# Patient Record
Sex: FEMALE | Race: WHITE | NOT HISPANIC OR LATINO | Employment: FULL TIME | URBAN - METROPOLITAN AREA
[De-identification: names, ages, dates, MRNs, and addresses within clinical notes are randomized per-mention and may not be internally consistent; named-entity substitution may affect disease eponyms.]

---

## 2019-11-26 LAB — CRC RECOMMENDATION EXT: NORMAL

## 2021-02-12 LAB — BCS RECOMMENDATION EXT: NORMAL

## 2022-04-10 ENCOUNTER — HISTORICAL (OUTPATIENT)
Dept: ADMINISTRATIVE | Facility: HOSPITAL | Age: 47
End: 2022-04-10

## 2022-04-28 VITALS
DIASTOLIC BLOOD PRESSURE: 87 MMHG | SYSTOLIC BLOOD PRESSURE: 134 MMHG | BODY MASS INDEX: 41.65 KG/M2 | WEIGHT: 250 LBS | HEIGHT: 65 IN | OXYGEN SATURATION: 98 %

## 2023-06-22 LAB
PAP RECOMMENDATION EXT: NORMAL
PAP SMEAR: NORMAL

## 2023-09-11 ENCOUNTER — TELEPHONE (OUTPATIENT)
Dept: INTERNAL MEDICINE | Facility: CLINIC | Age: 48
End: 2023-09-11
Payer: COMMERCIAL

## 2023-09-11 NOTE — TELEPHONE ENCOUNTER
----- Message from Kelly Stern LPN sent at 9/11/2023 11:25 AM CDT -----  Regarding: BHARATHI Burrell 9/18/23 @10:40a  Are there any outstanding tasks in the chart? New patient     Is there any documentation of tasks? no    Has patient seen another physician, been to the ER, C, or admitted to hospital since last visit?    Has the patient done blood work or imaging since last visit?

## 2023-09-18 ENCOUNTER — LAB VISIT (OUTPATIENT)
Dept: LAB | Facility: HOSPITAL | Age: 48
End: 2023-09-18
Attending: INTERNAL MEDICINE
Payer: COMMERCIAL

## 2023-09-18 ENCOUNTER — OFFICE VISIT (OUTPATIENT)
Dept: INTERNAL MEDICINE | Facility: CLINIC | Age: 48
End: 2023-09-18
Payer: COMMERCIAL

## 2023-09-18 VITALS
RESPIRATION RATE: 18 BRPM | BODY MASS INDEX: 46.65 KG/M2 | HEIGHT: 65 IN | OXYGEN SATURATION: 99 % | SYSTOLIC BLOOD PRESSURE: 134 MMHG | DIASTOLIC BLOOD PRESSURE: 72 MMHG | WEIGHT: 280 LBS | HEART RATE: 79 BPM

## 2023-09-18 DIAGNOSIS — R73.09 ELEVATED GLUCOSE: ICD-10-CM

## 2023-09-18 DIAGNOSIS — R73.09 ELEVATED GLUCOSE: Primary | ICD-10-CM

## 2023-09-18 DIAGNOSIS — N95.1 PERIMENOPAUSAL: ICD-10-CM

## 2023-09-18 PROBLEM — E66.01 MORBID OBESITY: Status: ACTIVE | Noted: 2023-09-18

## 2023-09-18 LAB
EST. AVERAGE GLUCOSE BLD GHB EST-MCNC: 128.4 MG/DL
HBA1C MFR BLD: 6.1 %

## 2023-09-18 PROCEDURE — 1159F PR MEDICATION LIST DOCUMENTED IN MEDICAL RECORD: ICD-10-PCS | Mod: CPTII,,, | Performed by: INTERNAL MEDICINE

## 2023-09-18 PROCEDURE — 3008F BODY MASS INDEX DOCD: CPT | Mod: CPTII,,, | Performed by: INTERNAL MEDICINE

## 2023-09-18 PROCEDURE — 99204 PR OFFICE/OUTPT VISIT, NEW, LEVL IV, 45-59 MIN: ICD-10-PCS | Mod: ,,, | Performed by: INTERNAL MEDICINE

## 2023-09-18 PROCEDURE — 3078F PR MOST RECENT DIASTOLIC BLOOD PRESSURE < 80 MM HG: ICD-10-PCS | Mod: CPTII,,, | Performed by: INTERNAL MEDICINE

## 2023-09-18 PROCEDURE — 3075F PR MOST RECENT SYSTOLIC BLOOD PRESS GE 130-139MM HG: ICD-10-PCS | Mod: CPTII,,, | Performed by: INTERNAL MEDICINE

## 2023-09-18 PROCEDURE — 3075F SYST BP GE 130 - 139MM HG: CPT | Mod: CPTII,,, | Performed by: INTERNAL MEDICINE

## 2023-09-18 PROCEDURE — 3008F PR BODY MASS INDEX (BMI) DOCUMENTED: ICD-10-PCS | Mod: CPTII,,, | Performed by: INTERNAL MEDICINE

## 2023-09-18 PROCEDURE — 3078F DIAST BP <80 MM HG: CPT | Mod: CPTII,,, | Performed by: INTERNAL MEDICINE

## 2023-09-18 PROCEDURE — 99204 OFFICE O/P NEW MOD 45 MIN: CPT | Mod: ,,, | Performed by: INTERNAL MEDICINE

## 2023-09-18 PROCEDURE — 1160F PR REVIEW ALL MEDS BY PRESCRIBER/CLIN PHARMACIST DOCUMENTED: ICD-10-PCS | Mod: CPTII,,, | Performed by: INTERNAL MEDICINE

## 2023-09-18 PROCEDURE — 83036 HEMOGLOBIN GLYCOSYLATED A1C: CPT

## 2023-09-18 PROCEDURE — 1159F MED LIST DOCD IN RCRD: CPT | Mod: CPTII,,, | Performed by: INTERNAL MEDICINE

## 2023-09-18 PROCEDURE — 36415 COLL VENOUS BLD VENIPUNCTURE: CPT

## 2023-09-18 PROCEDURE — 1160F RVW MEDS BY RX/DR IN RCRD: CPT | Mod: CPTII,,, | Performed by: INTERNAL MEDICINE

## 2023-09-18 RX ORDER — VENLAFAXINE HYDROCHLORIDE 75 MG/1
75 CAPSULE, EXTENDED RELEASE ORAL DAILY
Qty: 30 CAPSULE | Refills: 3 | Status: SHIPPED | OUTPATIENT
Start: 2023-09-18 | End: 2024-09-17

## 2023-09-18 NOTE — ASSESSMENT & PLAN NOTE
Will refer her to diabetes education.  I rec regular exercise, reduction of carbs, weight loss.  Check a1c again now and in 3 mos.

## 2023-09-18 NOTE — ASSESSMENT & PLAN NOTE
We discussed options for treatment -- HRT in general vs. nonhormonal options such as effexor.  She would like to try effexor for the irritability.  Recheck 3 mos.

## 2023-09-18 NOTE — PROGRESS NOTES
"Subjective:      Chief Complaint: Establish Care (She thinks she is in pre-menopause. She is having hot flashes )      HPI:She is here to [facility].  She was referred from Dr. Johnson for prediabetes and some menopausal sx coming on.  Her A1C was 6.2.      She started walking in March about a mile every morning.      Her periods are irregular and she feels like she is jenny-menopausal.  Her last period was several mos ago, and it was just spotting.  She c/o hot flashes, brain fog, intermittent blurry vision, and some irritability and some dizziness.    She had BPPV last year.      Problem Noted   Elevated Glucose 9/18/2023   Morbid Obesity 9/18/2023   Perimenopausal 9/18/2023        The patient's Health Maintenance was reviewed and the following appears to be due:   Health Maintenance Due   Topic Date Due    Hepatitis C Screening  Never done    Cervical Cancer Screening  Never done    Lipid Panel  Never done    HIV Screening  Never done    TETANUS VACCINE  Never done    Mammogram  Never done    Hemoglobin A1c (Diabetic Prevention Screening)  Never done    Colorectal Cancer Screening  Never done    COVID-19 Vaccine (3 - Pfizer series) 11/03/2021    Influenza Vaccine (1) 09/01/2023       Past Medical History:  History reviewed. No pertinent past medical history.  Review of patient's allergies indicates:  No Known Allergies  No current outpatient medications on file prior to visit.     No current facility-administered medications on file prior to visit.       Review of Systems    Objective:   /72 (BP Location: Right arm, Patient Position: Sitting, BP Method: Large (Manual))   Pulse 79   Resp 18   Ht 5' 5" (1.651 m)   Wt 127 kg (280 lb)   SpO2 99%   BMI 46.59 kg/m²     Physical Exam  Constitutional:       General: She is not in acute distress.     Appearance: Normal appearance.   HENT:      Head: Normocephalic and atraumatic.   Eyes:      General: No scleral icterus.     Conjunctiva/sclera: Conjunctivae normal. "   Neck:      Vascular: No carotid bruit.   Cardiovascular:      Rate and Rhythm: Normal rate and regular rhythm.      Pulses: Normal pulses.      Heart sounds: Normal heart sounds. No murmur heard.     No friction rub. No gallop.   Pulmonary:      Effort: Pulmonary effort is normal.      Breath sounds: Normal breath sounds.   Abdominal:      General: Bowel sounds are normal.      Palpations: Abdomen is soft. There is no mass.      Tenderness: There is no abdominal tenderness. There is no guarding or rebound.   Musculoskeletal:         General: No deformity.      Cervical back: No rigidity or tenderness.      Right lower leg: No edema.      Left lower leg: No edema.   Lymphadenopathy:      Cervical: No cervical adenopathy.   Skin:     Coloration: Skin is not jaundiced or pale.      Findings: No erythema.   Neurological:      General: No focal deficit present.      Mental Status: She is alert and oriented to person, place, and time.      Gait: Gait normal.   Psychiatric:         Mood and Affect: Mood normal.         Behavior: Behavior normal.         Thought Content: Thought content normal.         Judgment: Judgment normal.       Assessment and Plan:     Elevated glucose  Will refer her to diabetes education.  I rec regular exercise, reduction of carbs, weight loss.  Check a1c again now and in 3 mos.    Perimenopausal  We discussed options for treatment -- HRT in general vs. nonhormonal options such as effexor.  She would like to try effexor for the irritability.  Recheck 3 mos.      Follow up in about 3 months (around 12/18/2023).    Medications Ordered This Encounter   Medications    venlafaxine (EFFEXOR-XR) 75 MG 24 hr capsule     Sig: Take 1 capsule (75 mg total) by mouth once daily.     Dispense:  30 capsule     Refill:  3     [unfilled]  Orders Placed This Encounter   Procedures    Hemoglobin A1C     Standing Status:   Future     Standing Expiration Date:   11/16/2024    Ambulatory referral/consult to Diabetes  Education     Standing Status:   Future     Standing Expiration Date:   9/18/2024     Referral Priority:   Routine     Referral Type:   Consultation     Referral Reason:   Specialty Services Required     Requested Specialty:   Diabetes     Number of Visits Requested:   1     Expiration Date:   9/18/2024

## 2023-09-22 ENCOUNTER — CLINICAL SUPPORT (OUTPATIENT)
Dept: DIABETES | Facility: CLINIC | Age: 48
End: 2023-09-22
Payer: COMMERCIAL

## 2023-09-22 VITALS — WEIGHT: 278.63 LBS | BODY MASS INDEX: 46.36 KG/M2

## 2023-09-22 DIAGNOSIS — R73.09 ELEVATED GLUCOSE: ICD-10-CM

## 2023-09-22 PROCEDURE — G0108 DIAB MANAGE TRN  PER INDIV: HCPCS | Mod: PBBFAC | Performed by: DIETITIAN, REGISTERED

## 2023-09-22 PROCEDURE — 99213 OFFICE O/P EST LOW 20 MIN: CPT | Mod: PBBFAC | Performed by: DIETITIAN, REGISTERED

## 2023-09-22 NOTE — PROGRESS NOTES
Diabetes Care Specialist Progress Note  Author: Kandy Whitaker RD  Date: 9/22/2023    Program Intake  Reason for Diabetes Program Visit:: Initial Diabetes Assessment  Current diabetes risk level:: low  Permission to speak with others about care:: yes    Lab Results   Component Value Date    HGBA1C 6.1 09/18/2023       Clinical    Weight: 126.4 kg (278 lb 9.6 oz)       Body mass index is 46.36 kg/m².         Problem Review  Reviewed health maintenance: no (see comments)    Clinical Assessment  Current Diabetes Treatment:  (no meds prescribed)  Have you ever experienced hypoglycemia (low blood sugar)?: yes (Reports symptoms of hypoglycemia but glucometer read 90's. Unsure if symptoms may be inner ear.)    Medication Information  Medication adherence impacting ability to self-manage diabetes?: No    Labs  Do you have regular lab work to monitor your medications?: Yes  Lab Compliance Barriers: No    Nutritional Status  Diet: Regular  Meal Plan 24 Hour Recall:  (Reports eating fast food as problem area)  Change in appetite?: No  Current nutritional status an area of need that is impacting patient's ability to self-manage diabetes?: No    Additional Social History    Support  Does anyone support you with your diabetes care?: yes  Who supports you?: spouse  Does the current support meet the patient's needs?: Yes  Is Support an area impacting ability to self-manage diabetes?: No    Access to Mass Media & Technology  Does the patient have access to any of the following devices or technologies?: Smart phone, Home computer  Media or technology needs impacting ability to self-manage diabetes?: No    Cognitive/Behavioral Health  Alert and Oriented: Yes  Difficulty Thinking: No  Requires Prompting: No  Requires assistance for routine expression?: No  Cognitive or behavioral barriers impacting ability to self-manage diabetes?: No         Communication  Language preference: English  Hearing Problems: No  Vision Problems:  No  Communication needs impacting ability to self-manage diabetes?: No    Health Literacy  Preferred Learning Method: Face to Face, Reading Materials  How often do you need to have someone help you read instructions, pamphlets, or written material from your doctor or pharmacy?: Never  Health literacy needs impacting ability to self-manage diabetes?: No      Diabetes Self-Management Skills Assessment    Diabetes Disease Process/Treatment Options  Patient/caregiver able to state what happens when someone has diabetes.: yes  Patient/caregiver knows what type of diabetes they have.: yes  Diabetes Type : Pre-Diabetes  Patient able to identify at least three risk factors for diabetes.: yes  Identified risk factors:: age over 40, reduced activity, family history  Diabetes Disease Process/Treatment Options: Skills Assessment Completed: Yes  Assessment indicates:: Instruction Needed  Area of need?: Yes    Nutrition/Healthy Eating  Challenges to healthy eating:: eating out, going to parties  Method of carbohydrate measurement:: eyeballing/guessing. Eating brown rice and low glycemic index rice  Patient can identify foods that impact blood sugar.: yes  Patient-identified foods:: starchy vegetables (corn, peas, beans), starches (bread, pasta, rice, cereal), fruit/fruit juice, sweets, soda, yogurt, milk  Nutrition/Healthy Eating Skills Assessment Completed:: Yes  Assessment indicates:: Instruction Needed  Area of need?: Yes    Physical Activity/Exercise  Patient's daily activity level:: lightly active  Patient formally exercises outside of work.: no  Patient can identify forms of physical activity.: yes  Stated forms of physical activity:: any movement performed by muscles that uses energy, recreational activities  Physical Activity/Exercise Skills Assessment Completed: : Yes  Assessment indicates:: Instruction Needed  Area of need?: Yes    Medications  Medication Skills Assessment Completed:: No  Deffered due to:: Other  (comment) (not prescribed meds)  Area of need?: No    Home Blood Glucose Monitoring  Patient states that blood sugar is checked at home daily.: no  Home Blood Glucose Monitoring Skills Assessment Completed: : No  Deferred due to:: Other (comment) (not prescribed meter)  Area of need?: No    Acute Complications  Patient is able to identify types of acute complications: Yes  Patient Identified:: Hypoglycemia, Hyperglycemia  Patient is able to state the basic meaning of hypoglycemia?: Yes  Patient can identify general symptoms of hypoglycemia: yes  Patient identified:: fatigue, shakiness, sweating  Acute Complications Skills Assessment Completed: : Yes  Assessment indicates:: Instruction Needed  Area of need?: Yes    Chronic Complications  Chronic Complications Skills Assessment Completed: : No  Deferred due to:: Time    Psychosocial/Coping  Psychosocial/Coping Skills Assessment Completed: : No  Deffered due to:: Time      Assessment Summary and Plan    Based on today's diabetes care assessment, the following areas of need were identified:          9/22/2023    12:01 AM   Social   Support No   Access to Mass Media/Tech No   Cognitive/Behavioral Health No   Communication No   Health Literacy No            9/22/2023    12:01 AM   Clinical   Medication Adherence No   Lab Compliance No   Nutritional Status No            9/22/2023    12:01 AM   Diabetes Self-Management Skills   Diabetes Disease Process/Treatment Options Yes - Reviewed insulin resistance and ways to improve insulin sensitivity through regular activity. Reviewed A1C and ADA DM dx.   Nutrition/Healthy Eating Yes - Reviewed the sources and role of Carbohydrate. Reviewed serving sizes of starches, milk, fruit, starchy vegetables and snacks. Reviewed non-starchy vegetable list. Provided meal-planning instruction via foodlists, plate method and measuring cups. Provided sample menus and snack ideas. Discussed quick meal ideas at home, easy breakfast options and  smoothies and provided fast-food guide for on the go. Reviewed local restaurant options, [employer] and meal prep services. Discussed websites and apps   Physical Activity/Exercise Yes - see care plan. Has some home exercise equipment.    Medication No   Home Blood Glucose Monitoring No   Acute Complications Yes - Discussed identification signs/symptoms of hyperglycemia and hypoglycemia and when to contact clinic.          Today's interventions were provided through individual discussion, instruction, and written materials were provided.      Patient verbalized understanding of instruction and written materials.  Pt was able to return back demonstration of instructions today. Patient understood key points, needs reinforcement and further instruction.     Diabetes Self-Management Care Plan:    Today's Diabetes Self-Management Care Plan was developed with Brandee's input. Brandee has agreed to work toward the following goal(s) to improve his/her overall diabetes control.      Care Plan: Diabetes Management   Updates made since 8/23/2023 12:00 AM        Problem: Physical Activity and Exercise         Goal: Pt agrees to explore ways to be more active every day, such as family walks, treadmill, youtube videos    Start Date: 9/22/2023   Expected End Date: 12/20/2023   Priority: Medium   Barriers: No Barriers Identified        Task: Discussed role of physical activity on reducing insulin resistance and improvement in overall glycemic control. Completed 9/22/2023        Task: Discussed role of physical activity as it relates to weight loss Completed 9/22/2023        Task: Offered suggestions on how patient could increase their regular physical activity Completed 9/22/2023          Follow Up Plan     Follow up if symptoms worsen or fail to improve. To be determined by insurance coverage.    Today's care plan and follow up schedule was discussed with patient.  Brandee verbalized understanding of the care plan, goals, and agrees to  follow up plan.        The patient was encouraged to communicate with his/her health care provider/physician and care team regarding his/her condition(s) and treatment.  I provided the patient with my contact information today and encouraged to contact me via phone or [facility] Patient Portal as needed.     Length of Visit   Total Time: 60 Minutes

## 2023-12-13 ENCOUNTER — TELEPHONE (OUTPATIENT)
Dept: INTERNAL MEDICINE | Facility: CLINIC | Age: 48
End: 2023-12-13
Payer: COMMERCIAL

## 2023-12-13 DIAGNOSIS — R73.03 PREDIABETES: Primary | ICD-10-CM

## 2023-12-13 NOTE — TELEPHONE ENCOUNTER
----- Message from Kelly Stern LPN sent at 12/13/2023  8:27 AM CST -----  Regarding: BHARATHI Burrell 12/20/23 @10:40a  Are there any outstanding tasks in the chart? Pt will need nonfasting labs    Is there any documentation of tasks? no    Has patient seen another physician, been to the ER, [facility], or admitted to hospital since last visit?    Has the patient done blood work or imaging since last visit?

## 2023-12-18 ENCOUNTER — LAB VISIT (OUTPATIENT)
Dept: LAB | Facility: HOSPITAL | Age: 48
End: 2023-12-18
Attending: INTERNAL MEDICINE
Payer: COMMERCIAL

## 2023-12-18 DIAGNOSIS — R73.03 PREDIABETES: ICD-10-CM

## 2023-12-18 LAB
EST. AVERAGE GLUCOSE BLD GHB EST-MCNC: 116.9 MG/DL
HBA1C MFR BLD: 5.7 %

## 2023-12-18 PROCEDURE — 83036 HEMOGLOBIN GLYCOSYLATED A1C: CPT

## 2023-12-18 PROCEDURE — 36415 COLL VENOUS BLD VENIPUNCTURE: CPT

## 2023-12-20 ENCOUNTER — OFFICE VISIT (OUTPATIENT)
Dept: INTERNAL MEDICINE | Facility: CLINIC | Age: 48
End: 2023-12-20
Payer: COMMERCIAL

## 2023-12-20 VITALS
HEART RATE: 79 BPM | WEIGHT: 280 LBS | SYSTOLIC BLOOD PRESSURE: 120 MMHG | HEIGHT: 65 IN | RESPIRATION RATE: 18 BRPM | BODY MASS INDEX: 46.65 KG/M2 | DIASTOLIC BLOOD PRESSURE: 76 MMHG | OXYGEN SATURATION: 98 %

## 2023-12-20 DIAGNOSIS — R73.09 ELEVATED GLUCOSE: Primary | ICD-10-CM

## 2023-12-20 DIAGNOSIS — N95.1 PERIMENOPAUSAL: ICD-10-CM

## 2023-12-20 DIAGNOSIS — E66.01 MORBID OBESITY: ICD-10-CM

## 2023-12-20 PROCEDURE — 3074F PR MOST RECENT SYSTOLIC BLOOD PRESSURE < 130 MM HG: ICD-10-PCS | Mod: CPTII,,, | Performed by: INTERNAL MEDICINE

## 2023-12-20 PROCEDURE — 3078F DIAST BP <80 MM HG: CPT | Mod: CPTII,,, | Performed by: INTERNAL MEDICINE

## 2023-12-20 PROCEDURE — 1159F MED LIST DOCD IN RCRD: CPT | Mod: CPTII,,, | Performed by: INTERNAL MEDICINE

## 2023-12-20 PROCEDURE — 3074F SYST BP LT 130 MM HG: CPT | Mod: CPTII,,, | Performed by: INTERNAL MEDICINE

## 2023-12-20 PROCEDURE — 1160F PR REVIEW ALL MEDS BY PRESCRIBER/CLIN PHARMACIST DOCUMENTED: ICD-10-PCS | Mod: CPTII,,, | Performed by: INTERNAL MEDICINE

## 2023-12-20 PROCEDURE — 1159F PR MEDICATION LIST DOCUMENTED IN MEDICAL RECORD: ICD-10-PCS | Mod: CPTII,,, | Performed by: INTERNAL MEDICINE

## 2023-12-20 PROCEDURE — 3044F PR MOST RECENT HEMOGLOBIN A1C LEVEL <7.0%: ICD-10-PCS | Mod: CPTII,,, | Performed by: INTERNAL MEDICINE

## 2023-12-20 PROCEDURE — 99213 PR OFFICE/OUTPT VISIT, EST, LEVL III, 20-29 MIN: ICD-10-PCS | Mod: ,,, | Performed by: INTERNAL MEDICINE

## 2023-12-20 PROCEDURE — 99213 OFFICE O/P EST LOW 20 MIN: CPT | Mod: ,,, | Performed by: INTERNAL MEDICINE

## 2023-12-20 PROCEDURE — 3008F BODY MASS INDEX DOCD: CPT | Mod: CPTII,,, | Performed by: INTERNAL MEDICINE

## 2023-12-20 PROCEDURE — 3044F HG A1C LEVEL LT 7.0%: CPT | Mod: CPTII,,, | Performed by: INTERNAL MEDICINE

## 2023-12-20 PROCEDURE — 1160F RVW MEDS BY RX/DR IN RCRD: CPT | Mod: CPTII,,, | Performed by: INTERNAL MEDICINE

## 2023-12-20 PROCEDURE — 3008F PR BODY MASS INDEX (BMI) DOCUMENTED: ICD-10-PCS | Mod: CPTII,,, | Performed by: INTERNAL MEDICINE

## 2023-12-20 PROCEDURE — 3078F PR MOST RECENT DIASTOLIC BLOOD PRESSURE < 80 MM HG: ICD-10-PCS | Mod: CPTII,,, | Performed by: INTERNAL MEDICINE

## 2023-12-20 NOTE — ASSESSMENT & PLAN NOTE
A1C is a little better.  Continue to work on diet and routine exercise.  She has the s/sx of metabolic syndrome/pcos  with the hair thinning, facial hair growth, obestiy.  I rec she consider a dermatologist about the hair thinning and mentioned possibly metformin.  Will defer for now.

## 2023-12-20 NOTE — PROGRESS NOTES
"Subjective:      Chief Complaint: Follow-up (3mo/Discuss labs /)      HPI:She is here for f/u borderline DM.  She hasn't been exercising.  Her diet is more of a diabetic diet but her  is the cook and they still eat fast food.  No other complaints.  She has some hair thinning and facial hair growth.  Problem Noted   Elevated Glucose 9/18/2023   Morbid Obesity 9/18/2023   Perimenopausal 9/18/2023        The patient's Health Maintenance was reviewed and the following appears to be due:   Health Maintenance Due   Topic Date Due    Hepatitis C Screening  Never done    Cervical Cancer Screening  Never done    Lipid Panel  Never done    HIV Screening  Never done    TETANUS VACCINE  Never done    Mammogram  Never done    Colorectal Cancer Screening  Never done    COVID-19 Vaccine (3 - 2023-24 season) 09/01/2023       Past Medical History:  History reviewed. No pertinent past medical history.  Review of patient's allergies indicates:  No Known Allergies  Current Outpatient Medications on File Prior to Visit   Medication Sig Dispense Refill    venlafaxine (EFFEXOR-XR) 75 MG 24 hr capsule Take 1 capsule (75 mg total) by mouth once daily. (Patient not taking: Reported on 12/20/2023) 30 capsule 3     No current facility-administered medications on file prior to visit.       Review of Systems    Objective:   /76 (BP Location: Right arm, Patient Position: Sitting, BP Method: Large (Manual))   Pulse 79   Resp 18   Ht 5' 5" (1.651 m)   Wt 127 kg (280 lb)   SpO2 98%   BMI 46.59 kg/m²     Physical Exam  Vitals reviewed.   Constitutional:       General: She is not in acute distress.     Appearance: Normal appearance. She is not ill-appearing or diaphoretic.   HENT:      Head: Normocephalic and atraumatic.   Pulmonary:      Effort: Pulmonary effort is normal.   Skin:     General: Skin is warm and dry.   Neurological:      General: No focal deficit present.      Mental Status: She is alert.   Psychiatric:         Mood " and Affect: Mood normal.         Behavior: Behavior normal.         Thought Content: Thought content normal.         Judgment: Judgment normal.       Assessment and Plan:     Elevated glucose  A1C is a little better.  Continue to work on diet and routine exercise.  She has the s/sx of metabolic syndrome/pcos  with the hair thinning, facial hair growth, obestiy.  I rec she consider a dermatologist about the hair thinning and mentioned possibly metformin.  Will defer for now.    Perimenopausal  She didn't try the effexor, but she has it in case she changes her mind.      Follow up in about 6 months (around 6/20/2024).       [unfilled]  Orders Placed This Encounter   Procedures    CBC Auto Differential     Standing Status:   Future     Standing Expiration Date:   2/17/2025    Comprehensive Metabolic Panel     Standing Status:   Future     Standing Expiration Date:   2/17/2025    Lipid Panel     Standing Status:   Future     Standing Expiration Date:   6/20/2025    Hemoglobin A1C     Standing Status:   Future     Standing Expiration Date:   2/17/2025

## 2024-02-05 ENCOUNTER — PATIENT MESSAGE (OUTPATIENT)
Dept: ADMINISTRATIVE | Facility: HOSPITAL | Age: 49
End: 2024-02-05
Payer: COMMERCIAL

## 2024-02-06 ENCOUNTER — PATIENT OUTREACH (OUTPATIENT)
Dept: ADMINISTRATIVE | Facility: HOSPITAL | Age: 49
End: 2024-02-06
Payer: COMMERCIAL

## 2024-02-06 NOTE — LETTER
AUTHORIZATION FOR RELEASE OF   CONFIDENTIAL INFORMATION    Dear Dr. Shorty Johnson,    Fax: [phone]    We are seeing Brandee Walker, date of birth 1975, in the clinic at [place] [address] [facility]. Rima Burrell MD is the patient's PCP. Brandee Walker has an outstanding lab/procedure at the time we reviewed her chart. In order to help keep her health information updated, she has authorized us to request the following medical record(s):        (  )  MAMMOGRAM                                      (  )  COLONOSCOPY      ( X )  PAP SMEAR                                        (  )  OUTSIDE LAB RESULTS     (  )  DEXA SCAN                                          (  )  EYE EXAM            (  )  FOOT EXAM                                          (  )  ENTIRE RECORD     (  )  OUTSIDE IMMUNIZATIONS                 (  )  _______________         Please fax records to [facility], Snow, Angela D, MD, [phone]      If you have any question or concerns. Please call [facility] @ [phone]          Patient Name: Brandee Walker  : 1975  Patient Phone #: [phone]

## 2024-02-06 NOTE — PROGRESS NOTES
Population Health Outreach.    2/6/2024 Patient has respond to portal message. Request for Pap Smear Dr. DIANA Johnson.

## 2024-06-17 ENCOUNTER — TELEPHONE (OUTPATIENT)
Dept: INTERNAL MEDICINE | Facility: CLINIC | Age: 49
End: 2024-06-17
Payer: COMMERCIAL

## 2024-06-17 NOTE — TELEPHONE ENCOUNTER
----- Message from Kelly Stern LPN sent at 6/14/2024  7:53 AM CDT -----  Regarding: BHARATHI Burrell 6/21/24 @0920  Are there any outstanding tasks in the chart? Pt will need fasting labs    Is there any documentation of tasks? no    Please tell patient to bring living will, power of , or advance directive document to visit if they have it.

## 2024-06-18 ENCOUNTER — LAB VISIT (OUTPATIENT)
Dept: LAB | Facility: HOSPITAL | Age: 49
End: 2024-06-18
Attending: INTERNAL MEDICINE
Payer: COMMERCIAL

## 2024-06-18 DIAGNOSIS — R73.09 ELEVATED GLUCOSE: ICD-10-CM

## 2024-06-18 DIAGNOSIS — E66.01 MORBID OBESITY: ICD-10-CM

## 2024-06-18 LAB
ALBUMIN SERPL-MCNC: 3.3 G/DL (ref 3.5–5)
ALBUMIN/GLOB SERPL: 0.9 RATIO (ref 1.1–2)
ALP SERPL-CCNC: 82 UNIT/L (ref 40–150)
ALT SERPL-CCNC: 25 UNIT/L (ref 0–55)
ANION GAP SERPL CALC-SCNC: 6 MEQ/L
AST SERPL-CCNC: 19 UNIT/L (ref 5–34)
BASOPHILS # BLD AUTO: 0.04 X10(3)/MCL
BASOPHILS NFR BLD AUTO: 0.6 %
BILIRUB SERPL-MCNC: 0.5 MG/DL
BUN SERPL-MCNC: 9.8 MG/DL (ref 9.8–20.1)
CALCIUM SERPL-MCNC: 8.8 MG/DL (ref 8.4–10.2)
CHLORIDE SERPL-SCNC: 107 MMOL/L (ref 98–107)
CHOLEST SERPL-MCNC: 145 MG/DL
CHOLEST/HDLC SERPL: 3 {RATIO} (ref 0–5)
CO2 SERPL-SCNC: 26 MMOL/L (ref 22–29)
CREAT SERPL-MCNC: 0.83 MG/DL (ref 0.55–1.02)
CREAT/UREA NIT SERPL: 12
EOSINOPHIL # BLD AUTO: 0.04 X10(3)/MCL (ref 0–0.9)
EOSINOPHIL NFR BLD AUTO: 0.6 %
ERYTHROCYTE [DISTWIDTH] IN BLOOD BY AUTOMATED COUNT: 12.8 % (ref 11.5–17)
EST. AVERAGE GLUCOSE BLD GHB EST-MCNC: 154.2 MG/DL
GFR SERPLBLD CREATININE-BSD FMLA CKD-EPI: >60 ML/MIN/1.73/M2
GLOBULIN SER-MCNC: 3.5 GM/DL (ref 2.4–3.5)
GLUCOSE SERPL-MCNC: 155 MG/DL (ref 74–100)
HBA1C MFR BLD: 7 %
HCT VFR BLD AUTO: 42.1 % (ref 37–47)
HDLC SERPL-MCNC: 51 MG/DL (ref 35–60)
HGB BLD-MCNC: 14.2 G/DL (ref 12–16)
IMM GRANULOCYTES # BLD AUTO: 0.05 X10(3)/MCL (ref 0–0.04)
IMM GRANULOCYTES NFR BLD AUTO: 0.7 %
LDLC SERPL CALC-MCNC: 67 MG/DL (ref 50–140)
LYMPHOCYTES # BLD AUTO: 1.83 X10(3)/MCL (ref 0.6–4.6)
LYMPHOCYTES NFR BLD AUTO: 25.9 %
MCH RBC QN AUTO: 29 PG (ref 27–31)
MCHC RBC AUTO-ENTMCNC: 33.7 G/DL (ref 33–36)
MCV RBC AUTO: 85.9 FL (ref 80–94)
MONOCYTES # BLD AUTO: 0.46 X10(3)/MCL (ref 0.1–1.3)
MONOCYTES NFR BLD AUTO: 6.5 %
NEUTROPHILS # BLD AUTO: 4.65 X10(3)/MCL (ref 2.1–9.2)
NEUTROPHILS NFR BLD AUTO: 65.7 %
NRBC BLD AUTO-RTO: 0 %
PLATELET # BLD AUTO: 252 X10(3)/MCL (ref 130–400)
PLATELETS.RETICULATED NFR BLD AUTO: 3.7 % (ref 0.9–11.2)
PMV BLD AUTO: 10.5 FL (ref 7.4–10.4)
POTASSIUM SERPL-SCNC: 4 MMOL/L (ref 3.5–5.1)
PROT SERPL-MCNC: 6.8 GM/DL (ref 6.4–8.3)
RBC # BLD AUTO: 4.9 X10(6)/MCL (ref 4.2–5.4)
SODIUM SERPL-SCNC: 139 MMOL/L (ref 136–145)
TRIGL SERPL-MCNC: 133 MG/DL (ref 37–140)
VLDLC SERPL CALC-MCNC: 27 MG/DL
WBC # BLD AUTO: 7.07 X10(3)/MCL (ref 4.5–11.5)

## 2024-06-18 PROCEDURE — 36415 COLL VENOUS BLD VENIPUNCTURE: CPT

## 2024-06-18 PROCEDURE — 85025 COMPLETE CBC W/AUTO DIFF WBC: CPT

## 2024-06-18 PROCEDURE — 83036 HEMOGLOBIN GLYCOSYLATED A1C: CPT

## 2024-06-18 PROCEDURE — 80061 LIPID PANEL: CPT

## 2024-06-18 PROCEDURE — 80053 COMPREHEN METABOLIC PANEL: CPT

## 2024-06-21 ENCOUNTER — OFFICE VISIT (OUTPATIENT)
Dept: INTERNAL MEDICINE | Facility: CLINIC | Age: 49
End: 2024-06-21
Payer: COMMERCIAL

## 2024-06-21 VITALS
OXYGEN SATURATION: 98 % | SYSTOLIC BLOOD PRESSURE: 126 MMHG | WEIGHT: 284 LBS | HEART RATE: 79 BPM | RESPIRATION RATE: 18 BRPM | DIASTOLIC BLOOD PRESSURE: 80 MMHG | HEIGHT: 65 IN | BODY MASS INDEX: 47.32 KG/M2

## 2024-06-21 DIAGNOSIS — R73.09 ELEVATED GLUCOSE: ICD-10-CM

## 2024-06-21 DIAGNOSIS — Z00.00 WELL ADULT EXAM: Primary | ICD-10-CM

## 2024-06-21 DIAGNOSIS — E11.9 TYPE 2 DIABETES MELLITUS WITHOUT COMPLICATION, WITHOUT LONG-TERM CURRENT USE OF INSULIN: ICD-10-CM

## 2024-06-21 DIAGNOSIS — E66.01 MORBID OBESITY: ICD-10-CM

## 2024-06-21 RX ORDER — SEMAGLUTIDE 0.68 MG/ML
0.5 INJECTION, SOLUTION SUBCUTANEOUS
Qty: 3 ML | Refills: 2 | Status: SHIPPED | OUTPATIENT
Start: 2024-06-21

## 2024-06-21 NOTE — PROGRESS NOTES
"Subjective:      Chief Complaint: Annual Exam (Discuss labs )      HPI:She is here for a wellness.  She feels better.  She is sleeping better.  She feels she doesn't exercise as she should.  She was walking on a treadmill, but she hasn't been as consistent.  She doesn't follow a diabetic diet.  She has seen a dietician.  Problem Noted   Well Adult Exam 6/21/2024   Type 2 Diabetes Mellitus 6/21/2024   Elevated Glucose 9/18/2023   Morbid Obesity 9/18/2023   Perimenopausal 9/18/2023        The patient's Health Maintenance was reviewed and the following appears to be due:   Health Maintenance Due   Topic Date Due    Hepatitis C Screening  Never done    HIV Screening  Never done    TETANUS VACCINE  Never done    Mammogram  Never done    Colorectal Cancer Screening  Never done    COVID-19 Vaccine (3 - 2023-24 season) 09/01/2023    Shingles Vaccine (1 of 2) Never done       Past Medical History:  History reviewed. No pertinent past medical history.  Review of patient's allergies indicates:  No Known Allergies  Current Outpatient Medications on File Prior to Visit   Medication Sig Dispense Refill    [DISCONTINUED] venlafaxine (EFFEXOR-XR) 75 MG 24 hr capsule Take 1 capsule (75 mg total) by mouth once daily. (Patient not taking: Reported on 12/20/2023) 30 capsule 3     No current facility-administered medications on file prior to visit.       Review of Systems   Eyes: Negative.    Respiratory: Negative.     Cardiovascular: Negative.    Gastrointestinal: Negative.    Genitourinary: Negative.    Musculoskeletal:  Negative for myalgias.   Neurological: Negative.    Psychiatric/Behavioral: Negative.         Objective:   /80 (BP Location: Right forearm, Patient Position: Sitting, BP Method: Large (Manual))   Pulse 79   Resp 18   Ht 5' 5" (1.651 m)   Wt 128.8 kg (284 lb)   SpO2 98%   BMI 47.26 kg/m²     Physical Exam  Constitutional:       General: She is not in acute distress.     Appearance: Normal appearance. She is " obese.   HENT:      Head: Normocephalic and atraumatic.   Eyes:      General: No scleral icterus.     Conjunctiva/sclera: Conjunctivae normal.   Neck:      Vascular: No carotid bruit.   Cardiovascular:      Rate and Rhythm: Normal rate and regular rhythm.      Pulses: Normal pulses.      Heart sounds: Normal heart sounds. No murmur heard.     No friction rub. No gallop.   Pulmonary:      Effort: Pulmonary effort is normal.      Breath sounds: Normal breath sounds.   Abdominal:      General: Bowel sounds are normal.      Palpations: Abdomen is soft. There is no mass.      Tenderness: There is no abdominal tenderness. There is no guarding or rebound.   Musculoskeletal:         General: No deformity.      Cervical back: No rigidity or tenderness.      Right lower leg: No edema.      Left lower leg: No edema.   Lymphadenopathy:      Cervical: No cervical adenopathy.   Skin:     Coloration: Skin is not jaundiced or pale.      Findings: No erythema.   Neurological:      General: No focal deficit present.      Mental Status: She is alert and oriented to person, place, and time.      Gait: Gait normal.   Psychiatric:         Mood and Affect: Mood normal.         Behavior: Behavior normal.         Thought Content: Thought content normal.         Judgment: Judgment normal.       Assessment and Plan:     Well adult exam  Health Maintenance Due   Topic Date Due    Hepatitis C Screening  Never done    HIV Screening  Never done    TETANUS VACCINE  Never done    Mammogram  Never done    Colorectal Cancer Screening  Never done    COVID-19 Vaccine (3 - 2023-24 season) 09/01/2023    Shingles Vaccine (1 of 2) Never done     Recent labs reviewed and discussed.  Shingrix recommended.  But she doesn't think she ever had chicken pox, and she's beenvaccinated for chicken pox.    Type 2 diabetes mellitus  Trial of Ozempic.    Morbid obesity  Will evaluate for Cushings.    Follow up in about 3 months (around 9/21/2024).    Medications  Ordered This Encounter   Medications    semaglutide (OZEMPIC) 0.25 mg or 0.5 mg (2 mg/3 mL) pen injector     Sig: Inject 0.5 mg into the skin every 7 days.     Dispense:  3 mL     Refill:  2     [unfilled]  Orders Placed This Encounter   Procedures    Hemoglobin A1C     Standing Status:   Future     Standing Expiration Date:   12/21/2025    Microalbumin/Creatinine Ratio, Urine     Standing Status:   Future     Standing Expiration Date:   8/20/2025     Order Specific Question:   Specimen Source     Answer:   Urine    Cortisol, Urine, Free     Standing Status:   Future     Number of Occurrences:   1     Standing Expiration Date:   6/21/2025     Order Specific Question:   Specimen Source     Answer:   Urine    Cortisol, Saliva     Standing Status:   Future     Number of Occurrences:   1     Standing Expiration Date:   8/20/2025

## 2024-06-24 ENCOUNTER — LAB VISIT (OUTPATIENT)
Dept: LAB | Facility: HOSPITAL | Age: 49
End: 2024-06-24
Attending: INTERNAL MEDICINE
Payer: COMMERCIAL

## 2024-06-24 ENCOUNTER — DOCUMENTATION ONLY (OUTPATIENT)
Dept: INTERNAL MEDICINE | Facility: CLINIC | Age: 49
End: 2024-06-24
Payer: COMMERCIAL

## 2024-06-24 DIAGNOSIS — E66.01 MORBID OBESITY: Primary | ICD-10-CM

## 2024-06-24 DIAGNOSIS — E66.01 MORBID OBESITY: ICD-10-CM

## 2024-06-24 PROCEDURE — 82530 CORTISOL FREE: CPT

## 2024-06-25 LAB
PAP RECOMMENDATION EXT: NORMAL
PAP RECOMMENDATION EXT: NORMAL

## 2024-06-25 PROCEDURE — 82533 TOTAL CORTISOL: CPT | Performed by: INTERNAL MEDICINE

## 2024-06-27 ENCOUNTER — DOCUMENTATION ONLY (OUTPATIENT)
Dept: INTERNAL MEDICINE | Facility: CLINIC | Age: 49
End: 2024-06-27
Payer: COMMERCIAL

## 2024-06-28 LAB
COLLECT DURATION TIME UR: 24 H
CORTIS 24H UR-MRATE: 11 MCG/24 H (ref 3.5–45)
SPECIMEN VOL 24H UR: 1000 ML

## 2024-07-05 ENCOUNTER — DOCUMENTATION ONLY (OUTPATIENT)
Dept: INTERNAL MEDICINE | Facility: CLINIC | Age: 49
End: 2024-07-05
Payer: COMMERCIAL

## 2024-08-10 ENCOUNTER — HOSPITAL ENCOUNTER (EMERGENCY)
Facility: HOSPITAL | Age: 49
Discharge: HOME | End: 2024-08-10
Attending: EMERGENCY MEDICINE
Payer: COMMERCIAL

## 2024-08-10 VITALS
RESPIRATION RATE: 15 BRPM | WEIGHT: 280 LBS | SYSTOLIC BLOOD PRESSURE: 121 MMHG | DIASTOLIC BLOOD PRESSURE: 86 MMHG | HEIGHT: 65 IN | HEART RATE: 80 BPM | BODY MASS INDEX: 46.65 KG/M2 | OXYGEN SATURATION: 100 % | TEMPERATURE: 98 F

## 2024-08-10 DIAGNOSIS — R10.9 ACUTE RIGHT FLANK PAIN: Primary | ICD-10-CM

## 2024-08-10 DIAGNOSIS — M62.838 MUSCLE SPASM: ICD-10-CM

## 2024-08-10 LAB
ALBUMIN SERPL-MCNC: 3.9 G/DL (ref 3.5–5)
ALBUMIN/GLOB SERPL: 1.1 RATIO (ref 1.1–2)
ALP SERPL-CCNC: 86 UNIT/L (ref 40–150)
ALT SERPL-CCNC: 32 UNIT/L (ref 0–55)
ANION GAP SERPL CALC-SCNC: 15 MEQ/L
AST SERPL-CCNC: 29 UNIT/L (ref 5–34)
BASOPHILS # BLD AUTO: 0.05 X10(3)/MCL
BASOPHILS NFR BLD AUTO: 0.5 %
BILIRUB SERPL-MCNC: 0.5 MG/DL
BUN SERPL-MCNC: 14.8 MG/DL (ref 9.8–20.1)
CALCIUM SERPL-MCNC: 9.3 MG/DL (ref 8.4–10.2)
CHLORIDE SERPL-SCNC: 105 MMOL/L (ref 98–107)
CO2 SERPL-SCNC: 20 MMOL/L (ref 22–29)
CREAT SERPL-MCNC: 0.91 MG/DL (ref 0.55–1.02)
CREAT/UREA NIT SERPL: 16
EOSINOPHIL # BLD AUTO: 0.01 X10(3)/MCL (ref 0–0.9)
EOSINOPHIL NFR BLD AUTO: 0.1 %
ERYTHROCYTE [DISTWIDTH] IN BLOOD BY AUTOMATED COUNT: 12.7 % (ref 11.5–17)
GFR SERPLBLD CREATININE-BSD FMLA CKD-EPI: >60 ML/MIN/1.73/M2
GLOBULIN SER-MCNC: 3.4 GM/DL (ref 2.4–3.5)
GLUCOSE SERPL-MCNC: 157 MG/DL (ref 74–100)
HCT VFR BLD AUTO: 42.3 % (ref 37–47)
HGB BLD-MCNC: 14.5 G/DL (ref 12–16)
IMM GRANULOCYTES # BLD AUTO: 0.06 X10(3)/MCL (ref 0–0.04)
IMM GRANULOCYTES NFR BLD AUTO: 0.6 %
LYMPHOCYTES # BLD AUTO: 1.28 X10(3)/MCL (ref 0.6–4.6)
LYMPHOCYTES NFR BLD AUTO: 12.4 %
MCH RBC QN AUTO: 28.7 PG (ref 27–31)
MCHC RBC AUTO-ENTMCNC: 34.3 G/DL (ref 33–36)
MCV RBC AUTO: 83.8 FL (ref 80–94)
MONOCYTES # BLD AUTO: 0.47 X10(3)/MCL (ref 0.1–1.3)
MONOCYTES NFR BLD AUTO: 4.6 %
NEUTROPHILS # BLD AUTO: 8.44 X10(3)/MCL (ref 2.1–9.2)
NEUTROPHILS NFR BLD AUTO: 81.8 %
NRBC BLD AUTO-RTO: 0 %
PLATELET # BLD AUTO: 270 X10(3)/MCL (ref 130–400)
PLATELETS.RETICULATED NFR BLD AUTO: 3 % (ref 0.9–11.2)
PMV BLD AUTO: 10.2 FL (ref 7.4–10.4)
POTASSIUM SERPL-SCNC: 4.9 MMOL/L (ref 3.5–5.1)
PROT SERPL-MCNC: 7.3 GM/DL (ref 6.4–8.3)
RBC # BLD AUTO: 5.05 X10(6)/MCL (ref 4.2–5.4)
SODIUM SERPL-SCNC: 140 MMOL/L (ref 136–145)
WBC # BLD AUTO: 10.31 X10(3)/MCL (ref 4.5–11.5)

## 2024-08-10 PROCEDURE — 63600175 PHARM REV CODE 636 W HCPCS: Performed by: EMERGENCY MEDICINE

## 2024-08-10 PROCEDURE — 25500020 PHARM REV CODE 255: Performed by: EMERGENCY MEDICINE

## 2024-08-10 PROCEDURE — 85025 COMPLETE CBC W/AUTO DIFF WBC: CPT | Performed by: EMERGENCY MEDICINE

## 2024-08-10 PROCEDURE — 96376 TX/PRO/DX INJ SAME DRUG ADON: CPT

## 2024-08-10 PROCEDURE — 25000003 PHARM REV CODE 250: Performed by: EMERGENCY MEDICINE

## 2024-08-10 PROCEDURE — 96375 TX/PRO/DX INJ NEW DRUG ADDON: CPT

## 2024-08-10 PROCEDURE — 80053 COMPREHEN METABOLIC PANEL: CPT | Performed by: EMERGENCY MEDICINE

## 2024-08-10 PROCEDURE — 99285 EMERGENCY DEPT VISIT HI MDM: CPT | Mod: 25

## 2024-08-10 PROCEDURE — 96374 THER/PROPH/DIAG INJ IV PUSH: CPT

## 2024-08-10 RX ORDER — MORPHINE SULFATE 4 MG/ML
4 INJECTION, SOLUTION INTRAMUSCULAR; INTRAVENOUS
Status: COMPLETED | OUTPATIENT
Start: 2024-08-10 | End: 2024-08-10

## 2024-08-10 RX ORDER — HYDROCODONE BITARTRATE AND ACETAMINOPHEN 10; 325 MG/1; MG/1
1 TABLET ORAL EVERY 6 HOURS PRN
Qty: 12 TABLET | Refills: 0 | Status: SHIPPED | OUTPATIENT
Start: 2024-08-10

## 2024-08-10 RX ORDER — LIDOCAINE 50 MG/G
1 PATCH TOPICAL DAILY
Qty: 15 PATCH | Refills: 0 | Status: SHIPPED | OUTPATIENT
Start: 2024-08-10

## 2024-08-10 RX ORDER — ORPHENADRINE CITRATE 30 MG/ML
60 INJECTION INTRAMUSCULAR; INTRAVENOUS
Status: COMPLETED | OUTPATIENT
Start: 2024-08-10 | End: 2024-08-10

## 2024-08-10 RX ORDER — ONDANSETRON HYDROCHLORIDE 2 MG/ML
4 INJECTION, SOLUTION INTRAVENOUS
Status: COMPLETED | OUTPATIENT
Start: 2024-08-10 | End: 2024-08-10

## 2024-08-10 RX ORDER — METHOCARBAMOL 500 MG/1
1000 TABLET, FILM COATED ORAL 3 TIMES DAILY
Qty: 30 TABLET | Refills: 0 | Status: SHIPPED | OUTPATIENT
Start: 2024-08-10 | End: 2024-08-15

## 2024-08-10 RX ORDER — DIAZEPAM 5 MG/1
5 TABLET ORAL
Status: COMPLETED | OUTPATIENT
Start: 2024-08-10 | End: 2024-08-10

## 2024-08-10 RX ORDER — CYCLOBENZAPRINE HCL 10 MG
10 TABLET ORAL NIGHTLY PRN
Qty: 20 TABLET | Refills: 0 | Status: SHIPPED | OUTPATIENT
Start: 2024-08-10

## 2024-08-10 RX ORDER — LIDOCAINE 50 MG/G
1 PATCH TOPICAL
Status: DISCONTINUED | OUTPATIENT
Start: 2024-08-10 | End: 2024-08-10 | Stop reason: HOSPADM

## 2024-08-10 RX ORDER — KETOROLAC TROMETHAMINE 30 MG/ML
30 INJECTION, SOLUTION INTRAMUSCULAR; INTRAVENOUS
Status: COMPLETED | OUTPATIENT
Start: 2024-08-10 | End: 2024-08-10

## 2024-08-10 RX ADMIN — ONDANSETRON 4 MG: 2 INJECTION INTRAMUSCULAR; INTRAVENOUS at 06:08

## 2024-08-10 RX ADMIN — LIDOCAINE 1 PATCH: 50 PATCH CUTANEOUS at 06:08

## 2024-08-10 RX ADMIN — IOHEXOL 100 ML: 350 INJECTION, SOLUTION INTRAVENOUS at 03:08

## 2024-08-10 RX ADMIN — MORPHINE SULFATE 4 MG: 4 INJECTION, SOLUTION INTRAMUSCULAR; INTRAVENOUS at 02:08

## 2024-08-10 RX ADMIN — DIAZEPAM 5 MG: 5 TABLET ORAL at 05:08

## 2024-08-10 RX ADMIN — KETOROLAC TROMETHAMINE 30 MG: 30 INJECTION, SOLUTION INTRAMUSCULAR at 02:08

## 2024-08-10 RX ADMIN — MORPHINE SULFATE 4 MG: 4 INJECTION, SOLUTION INTRAMUSCULAR; INTRAVENOUS at 06:08

## 2024-08-10 RX ADMIN — ONDANSETRON 4 MG: 2 INJECTION INTRAMUSCULAR; INTRAVENOUS at 02:08

## 2024-08-10 RX ADMIN — ORPHENADRINE CITRATE 60 MG: 60 INJECTION INTRAMUSCULAR; INTRAVENOUS at 03:08

## 2024-08-10 NOTE — ED PROVIDER NOTES
"Encounter Date: 8/10/2024       History     Chief Complaint   Patient presents with    Flank Pain     Pt reports "muscle cramps" starting at 10pm. When asked; pt reports to right flank and states that pain is cramping and radiates to back. Also reports 2 episodes of vomiting PTA. Denies urinary complaints. Denies hx of kidney stones.      49-year-old female with no past medical history presenting with cramping to her right side that began tonight.  The pain is constant.  Reports nausea and vomiting.  Denies urinary complaints.  Reports that it hurts when she takes a deep breath. She says it is a muscle cramp that wraps around to her back. She has had similar pains in the past but today is tighter and worse. Denies any recent travel    The history is provided by the patient.     Review of patient's allergies indicates:  No Known Allergies  No past medical history on file.  Past Surgical History:   Procedure Laterality Date    HERNIA REPAIR  2013    Dr Neil Mcfadden  / umbilical hernia     Family History   Problem Relation Name Age of Onset    Cancer Mother Brandeefaviola Rg         colon    Miscarriages / Stillbirths Mother Brandeefaviola Rg     Hypertension Father Hossein Zelaya      Social History     Tobacco Use    Smoking status: Never    Smokeless tobacco: Never   Substance Use Topics    Alcohol use: Yes     Comment: Special occasion    Drug use: Never     Review of Systems   Gastrointestinal:  Positive for nausea and vomiting.   Genitourinary:  Positive for flank pain. Negative for dysuria.       Physical Exam     Initial Vitals [08/10/24 0126]   BP Pulse Resp Temp SpO2   (!) 150/88 68 (!) 24 97.5 °F (36.4 °C) 98 %      MAP       --         Physical Exam    Nursing note and vitals reviewed.  Constitutional: She appears well-developed and well-nourished. She is not diaphoretic. She does not appear ill.   Appears uncomfortable   HENT:   Head: Normocephalic and atraumatic.   Right Ear: External ear normal.   Left Ear: " External ear normal.   Mouth/Throat: Oropharynx is clear and moist.   Eyes: Conjunctivae are normal.   Neck: Neck supple. No tracheal deviation present.   Cardiovascular:  Normal rate, regular rhythm and normal heart sounds.           No murmur heard.  Pulmonary/Chest: Breath sounds normal. No respiratory distress. She has no wheezes. She has no rhonchi. She has no rales.   Abdominal: Abdomen is soft. Bowel sounds are normal. She exhibits no distension. There is no abdominal tenderness.   No right CVA tenderness.  No left CVA tenderness. There is no rebound and no guarding.   Musculoskeletal:         General: Normal range of motion.      Cervical back: Neck supple.     Neurological: She is alert and oriented to person, place, and time. GCS score is 15. GCS eye subscore is 4. GCS verbal subscore is 5. GCS motor subscore is 6.   Skin: Skin is warm and dry. Capillary refill takes less than 2 seconds. No rash noted. No pallor.         ED Course   Procedures  Labs Reviewed   COMPREHENSIVE METABOLIC PANEL - Abnormal       Result Value    Sodium 140      Potassium 4.9      Chloride 105      CO2 20 (*)     Glucose 157 (*)     Blood Urea Nitrogen 14.8      Creatinine 0.91      Calcium 9.3      Protein Total 7.3      Albumin 3.9      Globulin 3.4      Albumin/Globulin Ratio 1.1      Bilirubin Total 0.5      ALP 86      ALT 32      AST 29      eGFR >60      Anion Gap 15.0      BUN/Creatinine Ratio 16     CBC WITH DIFFERENTIAL - Abnormal    WBC 10.31      RBC 5.05      Hgb 14.5      Hct 42.3      MCV 83.8      MCH 28.7      MCHC 34.3      RDW 12.7      Platelet 270      MPV 10.2      Neut % 81.8      Lymph % 12.4      Mono % 4.6      Eos % 0.1      Basophil % 0.5      Lymph # 1.28      Neut # 8.44      Mono # 0.47      Eos # 0.01      Baso # 0.05      IG# 0.06 (*)     IG% 0.6      NRBC% 0.0      IPF 3.0     CBC W/ AUTO DIFFERENTIAL    Narrative:     The following orders were created for panel order CBC auto  differential.  Procedure                               Abnormality         Status                     ---------                               -----------         ------                     CBC with Differential[[id number]]       Abnormal            Final result                 Please view results for these tests on the individual orders.   URINALYSIS, REFLEX TO URINE CULTURE          Imaging Results              CT Abdomen Pelvis With IV Contrast NO Oral Contrast (Preliminary result)  Result time 08/10/24 03:41:02      Preliminary result by Torin Louis MD (08/10/24 03:41:02)                   Narrative:    START OF REPORT:  Technique: CT of the abdomen and pelvis was performed with axial images as well as sagittal and coronal reconstruction images with intravenous contrast.    Comparison: None available.    Clinical History: Pt reports muscle cramps starting at 10pm. When asked; pt reports to right flank and states that pain is cramping and radiates to back. Also reports 2 episodes of vomiting PTA. Denies urinary complaints. Denies hx of kidney stones.    Dosage Information: Automated Exposure Control was utilized 1087.55 mGy.cm.    Findings:  Lines and Tubes: None.  Thorax:  Lungs: There is minimal nonspecific dependent change at the lung bases. No focal infiltrate or consolidation is seen.  Pleura: No effusions or thickening.  Heart: The heart size is within normal limits.  Abdomen:  Abdominal Wall: No abdominal wall pathology is seen.  Liver: Mild fatty infiltration of the liver is present. The liver otherwise appears unremarkable.  Biliary System: No intrahepatic or extrahepatic biliary duct dilatation is seen.  Gallbladder: A few tiny gas-filled gallstones are seen in the gallbladder. A small amount of dependent hyperdensity is seen in the gallbladder which may represent sludge and/or tiny stones. The gallbladder otherwise appears unremarkable.  Pancreas: There is mild fatty replacement of the  pancreas.  Spleen: The spleen appears unremarkable.  Adrenals: The adrenal glands appear unremarkable.  Kidneys: The kidneys appear unremarkable with no stones cysts masses or hydronephrosis with IV contrast decreasing sensitivity and specificity for stones.  Aorta: The visualized abdominal aorta appears unremarkable.  IVC: Unremarkable.  Bowel:  Esophagus: The visualized distal esophagus appears unremarkable.  Stomach: The stomach appears unremarkable.  Duodenum: Unremarkable appearing duodenum.  Small Bowel: The small bowel appears unremarkable.  Colon: Nondistended.  Appendix: The appendix appears unremarkable and is seen on Image 118, Series 2 through Image 114, Series 2.  Peritoneum: No intraperitoneal free air or ascites is seen.    Pelvis:  Bladder: The bladder appears unremarkable.  Female:  Uterus: The uterus appears unremarkable for age. There is an ill-defined enhancing soft tissue density in the anterior uterus measuring 2.6 x 2.2 cm (series 2, image 135 and series 8, image 79). This may reflect a uterine fibroid. Correlate clinically as regards additional evaluation and follow-up as indicated.  Ovaries: The ovaries appear unremarkable.    Bony structures:  Dorsal Spine: There is mild spondylosis of the visualized dorsal spine.  Bony Pelvis: The visualized bony structures of the pelvis appear unremarkable.      Impression:  1. No acute intraabdominal or pelvic solid organ or bowel pathology identified. Details and other findings as discussed above.                                         Medications   LIDOcaine 5 % patch 1 patch (1 patch Transdermal Patch Applied 8/10/24 0605)   ketorolac injection 30 mg (30 mg Intravenous Given 8/10/24 0232)   ondansetron injection 4 mg (4 mg Intravenous Given 8/10/24 0232)   morphine injection 4 mg (4 mg Intravenous Given 8/10/24 0256)   iohexoL (OMNIPAQUE 350) injection 100 mL (100 mLs Intravenous Given 8/10/24 0315)   orphenadrine injection 60 mg (60 mg Intravenous  Given 8/10/24 0351)   diazePAM tablet 5 mg (5 mg Oral Given 8/10/24 0503)   morphine injection 4 mg (4 mg Intravenous Given 8/10/24 0605)   ondansetron injection 4 mg (4 mg Intravenous Given 8/10/24 0605)     Medical Decision Making  49-year-old female with right flank pain with nausea and vomiting.    Differential diagnosis includes not limited to ureteral stone, pyelo, UTI, muscular strain    Problems Addressed:  Acute right flank pain: acute illness or injury that poses a threat to life or bodily functions  Muscle spasm: acute illness or injury that poses a threat to life or bodily functions    Amount and/or Complexity of Data Reviewed  Labs: ordered. Decision-making details documented in ED Course.  Radiology: ordered. Decision-making details documented in ED Course.    Risk  Prescription drug management.  Parenteral controlled substances.                                      Clinical Impression:  Final diagnoses:  [R10.9] Acute right flank pain (Primary)  [M62.838] Muscle spasm          ED Disposition Condition    Discharge Stable          ED Prescriptions       Medication Sig Dispense Start Date End Date Auth. Provider    LIDOcaine (LIDODERM) 5 % Place 1 patch onto the skin once daily. Remove & Discard patch within 12 hours or as directed by MD 15 patch 8/10/2024 -- Kat Van MD    methocarbamoL (ROBAXIN) 500 MG Tab Take 2 tablets (1,000 mg total) by mouth 3 (three) times daily. for 5 days 30 tablet 8/10/2024 8/15/2024 Kat Van MD    cyclobenzaprine (FLEXERIL) 10 MG tablet Take 1 tablet (10 mg total) by mouth nightly as needed for Muscle spasms. 20 tablet 8/10/2024 -- Kat Van MD    HYDROcodone-acetaminophen (NORCO)  mg per tablet Take 1 tablet by mouth every 6 (six) hours as needed for Pain. 12 tablet 8/10/2024 -- Kat Van MD          Follow-up Information       Follow up With Specialties Details Why Contact Info    Rima Burrell MD Internal Medicine Schedule an  appointment as soon as possible for a visit   [address] [facility] [address]  [phone]      [facility] - Emergency Dept Emergency Medicine  As needed, If symptoms worsen [address] [facility] [id number] [phone]             Kat Van MD  08/10/24 0711

## 2024-08-17 ENCOUNTER — OFFICE VISIT (OUTPATIENT)
Dept: URGENT CARE | Facility: CLINIC | Age: 49
End: 2024-08-17
Payer: COMMERCIAL

## 2024-08-17 VITALS
HEIGHT: 65 IN | RESPIRATION RATE: 18 BRPM | HEART RATE: 89 BPM | DIASTOLIC BLOOD PRESSURE: 89 MMHG | OXYGEN SATURATION: 97 % | TEMPERATURE: 97 F | WEIGHT: 280 LBS | SYSTOLIC BLOOD PRESSURE: 146 MMHG | BODY MASS INDEX: 46.65 KG/M2

## 2024-08-17 DIAGNOSIS — M54.6 ACUTE RIGHT-SIDED THORACIC BACK PAIN: ICD-10-CM

## 2024-08-17 DIAGNOSIS — R07.89 RIGHT-SIDED CHEST WALL PAIN: Primary | ICD-10-CM

## 2024-08-17 PROCEDURE — 99214 OFFICE O/P EST MOD 30 MIN: CPT | Mod: ,,, | Performed by: FAMILY MEDICINE

## 2024-08-17 RX ORDER — PREDNISONE 20 MG/1
TABLET ORAL
Qty: 8 TABLET | Refills: 0 | Status: SHIPPED | OUTPATIENT
Start: 2024-08-17

## 2024-08-17 RX ORDER — PROGESTERONE 200 MG/1
200 CAPSULE ORAL NIGHTLY
COMMUNITY
Start: 2024-07-31

## 2024-08-17 NOTE — PROGRESS NOTES
"Subjective:      Patient ID: Brandee Walker is a 49 y.o. female.    Vitals:  height is 5' 5" (1.651 m) and weight is 127 kg (280 lb). Her temperature is 97.4 °F (36.3 °C). Her blood pressure is 146/89 (abnormal) and her pulse is 89. Her respiration is 18 and oxygen saturation is 97%.     Chief Complaint: Injury (Muscle tightness and spasms around chest rib cage / back - Entered by patient)     Patient is a 49 y.o. female who presents to urgent care with complaints of muscle spasms  and chest tightness, did go to ER Saturday the 10th morning 1 AM was given meds but she is not getting relief.    ROS :  Constitutional : No fever, no fatigue  Neck : Negative except HPI  Respiratory : No shortness of breath, no wheezing  Cardiovascular : No chest pain  Musculoskeletal : Negative except HPI  Integumentary : No rash, no abnormal lesion  Neurological : Negative for tingling numbness and weakness     [place]:  49-year-old female with known history of possible prediabetes currently on Ozempic.  Present to clinic with concerns of muscle spasm in right side chest tightness on and off since 1 week.  States mid back pain wraps around laterally mainly on right side.  No fall, no trauma.  For similar complaints was seen in the ER a week ago.  For which patient had CT of the abdomen, reviewed CT results no acute finding.  No fever, no recent upper or lower respiratory infections.  Can not recall any activity to start the pain.  Currently standing and walking around in the room holding on right side.  States right mid chest wall hurts.  Currently on hydrocodone and muscle relaxant states nothing is helping her.  Defers EKG today.  Has appointment with primary MD on Wednesday  Patient states had full physical exam including rest exam recently with OBGYN, no acute findings.  Objective:     Physical Exam  General : Alert and oriented, No apparent distress, Afebrile, standing and walking around in the room complains of pain and crying " holding right waist  Neck -: supple, Non Tender  Respiratory :Lungs are clear to auscultate, Breath sounds are equal  Cardiovascular : Normal rate, normal volume pulse bilateral  Musculoskeletal :  Gait appears normal, joints full range of motion.  Spine no point tenderness.  Midback and lateral chest wall tender to palpate same area.  No visible rash or bruising.  Patient keeps complaining of right mid chest wall pain, tender to palpate.  No palpable swelling or bruising or rash.  Integumentary : Warm, Dry and no rash  Neurologic : Alert and Oriented X 4, sensations intact, motor intact     In and out of the room, sharing her previous history, ER visit and present symptoms.  Spent 25 minutes in examination room  Assessment:     1. Right-sided chest wall pain    2. Acute right-sided thoracic back pain      Plan:   Considering ongoing pain x-rays today.  No acute finding.  Radiology final results will be monitored and reported.  Monitor the symptoms closely.  For persistent and worsening symptoms encouraged to go to ER  Reviewed last ER visit notes in the chart.  Prednisone as anti inflammation for symptom relief.  Differential diagnosis of musculoskeletal, neuralgia, nonspecific  Call this clinic for any questions.  Encouraged to follow up with primary MD on Monday    Right-sided chest wall pain  -     XR CHEST PA AND LATERAL; Future; Expected date: 08/17/2024  -     predniSONE (DELTASONE) 20 MG tablet; One tablet orally twice daily for 3 days and then once daily for 2 days  Dispense: 8 tablet; Refill: 0    Acute right-sided thoracic back pain  -     predniSONE (DELTASONE) 20 MG tablet; One tablet orally twice daily for 3 days and then once daily for 2 days  Dispense: 8 tablet; Refill: 0

## 2024-08-17 NOTE — PATIENT INSTRUCTIONS
Considering ongoing pain x-rays today.  No acute finding.  Radiology final results will be monitored and reported.  Monitor the symptoms closely.  For persistent and worsening symptoms encouraged to go to ER  Reviewed last ER visit notes in the chart.  Prednisone as anti inflammation for symptom relief.  Differential diagnosis of musculoskeletal, neuralgia, nonspecific  Call this clinic for any questions.  Encouraged to follow up with primary MD on Monday

## 2024-08-20 ENCOUNTER — OFFICE VISIT (OUTPATIENT)
Dept: INTERNAL MEDICINE | Facility: CLINIC | Age: 49
End: 2024-08-20
Payer: COMMERCIAL

## 2024-08-20 VITALS
WEIGHT: 265 LBS | SYSTOLIC BLOOD PRESSURE: 126 MMHG | OXYGEN SATURATION: 98 % | BODY MASS INDEX: 44.15 KG/M2 | HEART RATE: 68 BPM | HEIGHT: 65 IN | DIASTOLIC BLOOD PRESSURE: 64 MMHG

## 2024-08-20 DIAGNOSIS — M54.30 SCIATICA, UNSPECIFIED LATERALITY: ICD-10-CM

## 2024-08-20 DIAGNOSIS — S29.011A MUSCLE STRAIN OF CHEST WALL, INITIAL ENCOUNTER: ICD-10-CM

## 2024-08-20 DIAGNOSIS — M79.18 MUSCULAR ABDOMINAL PAIN IN RIGHT FLANK: Primary | ICD-10-CM

## 2024-08-20 PROCEDURE — 96372 THER/PROPH/DIAG INJ SC/IM: CPT | Mod: ,,,

## 2024-08-20 PROCEDURE — 99214 OFFICE O/P EST MOD 30 MIN: CPT | Mod: 25,,,

## 2024-08-20 RX ORDER — KETOROLAC TROMETHAMINE 30 MG/ML
15 INJECTION, SOLUTION INTRAMUSCULAR; INTRAVENOUS
Status: COMPLETED | OUTPATIENT
Start: 2024-08-20 | End: 2024-08-20

## 2024-08-20 RX ORDER — DICLOFENAC SODIUM 75 MG/1
75 TABLET, DELAYED RELEASE ORAL 2 TIMES DAILY PRN
Qty: 14 TABLET | Refills: 0 | Status: SHIPPED | OUTPATIENT
Start: 2024-08-20 | End: 2024-08-27

## 2024-08-20 RX ORDER — METHOCARBAMOL 750 MG/1
750 TABLET, FILM COATED ORAL 3 TIMES DAILY PRN
Qty: 30 TABLET | Refills: 0 | Status: SHIPPED | OUTPATIENT
Start: 2024-08-20 | End: 2024-08-30

## 2024-08-20 RX ADMIN — KETOROLAC TROMETHAMINE 15 MG: 30 INJECTION, SOLUTION INTRAMUSCULAR; INTRAVENOUS at 09:08

## 2024-08-20 NOTE — ASSESSMENT & PLAN NOTE
-acute   -ER and urgent care visits reviewed   -chest x-ray and CT abdomen pelvis essentially normal   -mild improvement with massage  -IM Toradol 15 mg in office x1 dose  -initiate diclofenac 75 mg b.i.d. p.r.n.   -initiate Robaxin 750 mg t.i.d. p.r.n.  -previously prescribed lidocaine patch, stefany to continue  -referred to physical therapy  -also established with massage therapy and chiropractor, stefany to continue  -encourage heat application   -stefany to utilize OTC analgesics lab

## 2024-08-20 NOTE — ASSESSMENT & PLAN NOTE
-history of an currently stable   -initiating on NSAIDs as noted above  -okay to add physical therapy for sciatica exercises Education

## 2024-08-20 NOTE — ASSESSMENT & PLAN NOTE
-acute   -ER and urgent care visits reviewed   -chest x-ray and CT abdomen pelvis essentially normal   -mild improvement with massage  -IM Toradol 15 mg in office x1 dose  -initiate diclofenac 75 mg b.i.d. p.r.n.   -initiate Robaxin 750 mg t.i.d. p.r.n.  -previously prescribed lidocaine patch, okay to continue  -referred to physical therapy  -also established with massage therapy and chiropractor, stefany to continue  -encourage heat application   -stefany to utilize OTC analgesics lab  Notify clinic for persistence or worsening of symptoms, we will consider HIDA scan or further diagnostics that point

## 2024-09-18 ENCOUNTER — TELEPHONE (OUTPATIENT)
Dept: INTERNAL MEDICINE | Facility: CLINIC | Age: 49
End: 2024-09-18
Payer: COMMERCIAL

## 2024-09-18 NOTE — TELEPHONE ENCOUNTER
----- Message from Kelly Stern LPN sent at 9/18/2024  8:19 AM CDT -----  Regarding: BHARATHI Burrell 9/25/24 @10:40a  Are there any outstanding tasks in the chart? Patient will need nonfasting labs    Is there any documentation of tasks? no    Please tell patient to bring living will, power of , or advance directive document to visit if they have it.

## 2024-09-23 ENCOUNTER — LAB VISIT (OUTPATIENT)
Dept: LAB | Facility: HOSPITAL | Age: 49
End: 2024-09-23
Attending: INTERNAL MEDICINE
Payer: COMMERCIAL

## 2024-09-23 DIAGNOSIS — E11.9 TYPE 2 DIABETES MELLITUS WITHOUT COMPLICATION, WITHOUT LONG-TERM CURRENT USE OF INSULIN: ICD-10-CM

## 2024-09-23 PROBLEM — Z00.00 WELL ADULT EXAM: Status: RESOLVED | Noted: 2024-06-21 | Resolved: 2024-09-23

## 2024-09-23 LAB
CREAT UR-MCNC: 135.7 MG/DL (ref 45–106)
EST. AVERAGE GLUCOSE BLD GHB EST-MCNC: 99.7 MG/DL
HBA1C MFR BLD: 5.1 %
MICROALBUMIN UR-MCNC: 57.5 UG/ML
MICROALBUMIN/CREAT RATIO PNL UR: 42.4 MG/GM CR (ref 0–30)

## 2024-09-23 PROCEDURE — 82043 UR ALBUMIN QUANTITATIVE: CPT

## 2024-09-23 PROCEDURE — 82570 ASSAY OF URINE CREATININE: CPT

## 2024-09-23 PROCEDURE — 36415 COLL VENOUS BLD VENIPUNCTURE: CPT

## 2024-09-23 PROCEDURE — 83036 HEMOGLOBIN GLYCOSYLATED A1C: CPT

## 2024-09-25 ENCOUNTER — OFFICE VISIT (OUTPATIENT)
Dept: INTERNAL MEDICINE | Facility: CLINIC | Age: 49
End: 2024-09-25
Payer: COMMERCIAL

## 2024-09-25 VITALS
BODY MASS INDEX: 43.82 KG/M2 | SYSTOLIC BLOOD PRESSURE: 120 MMHG | RESPIRATION RATE: 18 BRPM | OXYGEN SATURATION: 99 % | DIASTOLIC BLOOD PRESSURE: 68 MMHG | HEART RATE: 87 BPM | WEIGHT: 263 LBS | HEIGHT: 65 IN

## 2024-09-25 DIAGNOSIS — E11.9 TYPE 2 DIABETES MELLITUS WITHOUT COMPLICATION, WITHOUT LONG-TERM CURRENT USE OF INSULIN: Primary | ICD-10-CM

## 2024-09-25 DIAGNOSIS — S29.011D MUSCLE STRAIN OF CHEST WALL, SUBSEQUENT ENCOUNTER: ICD-10-CM

## 2024-09-25 PROCEDURE — 99214 OFFICE O/P EST MOD 30 MIN: CPT | Mod: ,,, | Performed by: INTERNAL MEDICINE

## 2024-09-25 RX ORDER — TESTOSTERONE GEL, 1% 10 MG/G
GEL TRANSDERMAL NIGHTLY
COMMUNITY

## 2024-09-25 NOTE — ASSESSMENT & PLAN NOTE
I agree with posture, stretching and core strengthening.  She can try magnesium oxide to see if that helps.  And she needs to make sure she is hydrating well.

## 2024-09-25 NOTE — PROGRESS NOTES
Subjective:      Chief Complaint: Follow-up (3mo/Discuss labs/She was seen in ER since last visit- she had some tight muscle spasms under breast area on both sides)      HPI:She is here for f/u DM type 2.  Since our last visit, she ended up in the ER with bilateral lower thoracic spasms around her ribs.  Then she subsequently saw urgent care and [facility] for the same problem.  Ultimately it was thought to be musculoskeletal.  She is in PT and is working on her posture and stretching/strengthening.  She is doing fine with the Ozempic.  She's lost 21 pounds.  She hasn't noticed any s.e. from the Ozempic.  dEnies constipation.    Problem Noted   Muscle Strain of Chest Wall 8/20/2024   Type 2 Diabetes Mellitus 6/21/2024   Elevated Glucose 9/18/2023   Morbid Obesity 9/18/2023   Perimenopausal 9/18/2023   Well Adult Exam (Resolved) 6/21/2024        The patient's Health Maintenance was reviewed and the following appears to be due:   Health Maintenance Due   Topic Date Due    Hepatitis C Screening  Never done    HIV Screening  Never done    TETANUS VACCINE  Never done    Mammogram  02/12/2022    Colorectal Cancer Screening  11/26/2022    Shingles Vaccine (1 of 2) Never done    COVID-19 Vaccine (3 - 2023-24 season) 09/01/2024       Past Medical History:  History reviewed. No pertinent past medical history.  Review of patient's allergies indicates:  No Known Allergies  Current Outpatient Medications on File Prior to Visit   Medication Sig Dispense Refill    LIDOcaine (LIDODERM) 5 % Place 1 patch onto the skin once daily. Remove & Discard patch within 12 hours or as directed by MD 15 patch 0    progesterone (PROMETRIUM) 200 MG capsule Take 200 mg by mouth every evening.      semaglutide (OZEMPIC) 0.25 mg or 0.5 mg (2 mg/3 mL) pen injector Inject 0.5 mg into the skin every 7 days. 3 mL 2    testosterone (ANDROGEL) 1 % (50 mg/5 gram) GlPk Apply topically every evening.      [DISCONTINUED] HYDROcodone-acetaminophen (NORCO)   "mg per tablet Take 1 tablet by mouth every 6 (six) hours as needed for Pain. 12 tablet 0    [DISCONTINUED] predniSONE (DELTASONE) 20 MG tablet One tablet orally twice daily for 3 days and then once daily for 2 days 8 tablet 0     No current facility-administered medications on file prior to visit.       Review of Systems    Objective:   /68 (BP Location: Right arm, Patient Position: Sitting, BP Method: Large (Manual))   Pulse 87   Resp 18   Ht 5' 5" (1.651 m)   Wt 119.3 kg (263 lb)   SpO2 99%   BMI 43.77 kg/m²     Physical Exam  Vitals reviewed.   Constitutional:       General: She is not in acute distress.     Appearance: Normal appearance. She is not ill-appearing or diaphoretic.   HENT:      Head: Normocephalic and atraumatic.   Pulmonary:      Effort: Pulmonary effort is normal.   Skin:     General: Skin is warm and dry.   Neurological:      General: No focal deficit present.      Mental Status: She is alert.   Psychiatric:         Mood and Affect: Mood normal.         Behavior: Behavior normal.         Thought Content: Thought content normal.         Judgment: Judgment normal.       Assessment and Plan:     Type 2 diabetes mellitus  Improved.  Continue Ozempic 0.5 mg weekly.  Recheck 3 mos.  Urine microalbumin was elevated.  Renal function is fine on las from August.  Will recheck at a later date.    Muscle strain of chest wall  I agree with posture, stretching and core strengthening.  She can try magnesium oxide to see if that helps.  And she needs to make sure she is hydrating well.      Follow up in about 3 months (around 12/25/2024).       [unfilled]  No orders of the defined types were placed in this encounter.      "

## 2024-09-25 NOTE — ASSESSMENT & PLAN NOTE
Improved.  Continue Ozempic 0.5 mg weekly.  Recheck 3 mos.  Urine microalbumin was elevated.  Renal function is fine on las from August.  Will recheck at a later date.

## 2024-10-06 ENCOUNTER — PATIENT MESSAGE (OUTPATIENT)
Dept: INTERNAL MEDICINE | Facility: CLINIC | Age: 49
End: 2024-10-06
Payer: COMMERCIAL

## 2024-10-06 DIAGNOSIS — M54.9 BACK PAIN, UNSPECIFIED BACK LOCATION, UNSPECIFIED BACK PAIN LATERALITY, UNSPECIFIED CHRONICITY: Primary | ICD-10-CM

## 2024-10-08 RX ORDER — CYCLOBENZAPRINE HCL 5 MG
5 TABLET ORAL 3 TIMES DAILY PRN
Qty: 30 TABLET | Refills: 1 | Status: SHIPPED | OUTPATIENT
Start: 2024-10-08 | End: 2024-10-18

## 2024-10-09 ENCOUNTER — HOSPITAL ENCOUNTER (OUTPATIENT)
Dept: RADIOLOGY | Facility: HOSPITAL | Age: 49
Discharge: HOME | End: 2024-10-09
Attending: INTERNAL MEDICINE
Payer: COMMERCIAL

## 2024-10-09 DIAGNOSIS — M54.9 BACK PAIN, UNSPECIFIED BACK LOCATION, UNSPECIFIED BACK PAIN LATERALITY, UNSPECIFIED CHRONICITY: ICD-10-CM

## 2024-10-09 PROCEDURE — 72110 X-RAY EXAM L-2 SPINE 4/>VWS: CPT | Mod: TC

## 2024-10-09 PROCEDURE — 72070 X-RAY EXAM THORAC SPINE 2VWS: CPT | Mod: TC

## 2024-10-09 NOTE — PROGRESS NOTES
Let her know the xray shows some mild degenerative changes.  I was reviewing the CT scan from the ER.  It shows she has gall stones.  Has she noticed any relationship of her pain to eating?  Gallstone can sometimes cause back pain.  Did she try the muscle relaxer?

## 2024-10-17 RX ORDER — SEMAGLUTIDE 0.68 MG/ML
0.5 INJECTION, SOLUTION SUBCUTANEOUS
Qty: 3 ML | Refills: 6 | Status: SHIPPED | OUTPATIENT
Start: 2024-10-17

## 2024-10-30 ENCOUNTER — PATIENT MESSAGE (OUTPATIENT)
Dept: INTERNAL MEDICINE | Facility: CLINIC | Age: 49
End: 2024-10-30
Payer: COMMERCIAL

## 2024-11-22 LAB — BCS RECOMMENDATION EXT: NORMAL

## 2024-11-25 ENCOUNTER — DOCUMENTATION ONLY (OUTPATIENT)
Dept: INTERNAL MEDICINE | Facility: CLINIC | Age: 49
End: 2024-11-25
Payer: COMMERCIAL

## 2024-12-04 ENCOUNTER — TELEPHONE (OUTPATIENT)
Dept: INTERNAL MEDICINE | Facility: CLINIC | Age: 49
End: 2024-12-04
Payer: COMMERCIAL

## 2024-12-04 NOTE — TELEPHONE ENCOUNTER
PA was denied. Insurance wanted proof of type 2 diabetes. I sent them the records via fax. Awaiting decision. Patient has been out of the ozempic since Saturday. Would like a sample if possible.

## 2024-12-04 NOTE — TELEPHONE ENCOUNTER
----- Message from [employer] sent at 12/4/2024 11:21 AM CST -----  Regarding: med advice  Who Called: Brandee Walker    Caller is requesting assistance/information from provider's office.    Symptoms (please be specific):    How long has patient had these symptoms:    List of preferred pharmacies on file (remove unneeded): [unfilled]  If different, enter pharmacy into here including location and phone number:       Preferred Method of Contact: Phone Call  Patient's Preferred Phone Number on File: [phone]   Best Call Back Number, if different:  Additional Information: pt is requesting a call back regarding the status of prior authorization for semaglutide

## 2024-12-16 ENCOUNTER — PATIENT MESSAGE (OUTPATIENT)
Dept: INTERNAL MEDICINE | Facility: CLINIC | Age: 49
End: 2024-12-16
Payer: COMMERCIAL

## 2024-12-16 ENCOUNTER — TELEPHONE (OUTPATIENT)
Dept: INTERNAL MEDICINE | Facility: CLINIC | Age: 49
End: 2024-12-16
Payer: COMMERCIAL

## 2024-12-16 NOTE — TELEPHONE ENCOUNTER
----- Message from Karena sent at 12/16/2024  9:28 AM CST -----  Who Called: Brandee Walker    Caller is requesting assistance/information from provider's office.    Symptoms (please be specific): n/a   How long has patient had these symptoms:  n/a  List of preferred pharmacies on file (remove unneeded): n/a      Preferred Method of Contact: Phone Call  Patient's Preferred Phone Number on File: [phone]   Best Call Back Number, if different:  Additional Information: Pt is requesting a call for PA for semaglutide (OZEMPIC) 0.25 mg or 0.5 mg (2 mg/3 mL) pen injector. Please advise.

## 2024-12-16 NOTE — TELEPHONE ENCOUNTER
I called and spoke with patient's insurance. They said it was denied a second time for the ozempic bc they want proof of patient trying metformin before they would cover the ozempic. They will fax the document again.

## 2024-12-26 ENCOUNTER — OFFICE VISIT (OUTPATIENT)
Dept: INTERNAL MEDICINE | Facility: CLINIC | Age: 49
End: 2024-12-26
Payer: COMMERCIAL

## 2024-12-26 VITALS
HEART RATE: 78 BPM | HEIGHT: 65 IN | WEIGHT: 254 LBS | BODY MASS INDEX: 42.32 KG/M2 | OXYGEN SATURATION: 98 % | SYSTOLIC BLOOD PRESSURE: 126 MMHG | RESPIRATION RATE: 18 BRPM | DIASTOLIC BLOOD PRESSURE: 72 MMHG

## 2024-12-26 DIAGNOSIS — E11.9 TYPE 2 DIABETES MELLITUS WITHOUT COMPLICATION, WITHOUT LONG-TERM CURRENT USE OF INSULIN: ICD-10-CM

## 2024-12-26 DIAGNOSIS — E66.01 MORBID OBESITY: Primary | ICD-10-CM

## 2024-12-26 PROBLEM — M79.18 MUSCULAR ABDOMINAL PAIN IN RIGHT FLANK: Status: RESOLVED | Noted: 2024-08-20 | Resolved: 2024-12-26

## 2024-12-26 PROBLEM — S29.011A MUSCLE STRAIN OF CHEST WALL: Status: RESOLVED | Noted: 2024-08-20 | Resolved: 2024-12-26

## 2024-12-26 RX ORDER — METFORMIN HYDROCHLORIDE 500 MG/1
500 TABLET ORAL 2 TIMES DAILY WITH MEALS
Qty: 60 TABLET | Refills: 6 | Status: SHIPPED | OUTPATIENT
Start: 2024-12-26 | End: 2025-12-26

## 2024-12-26 NOTE — PROGRESS NOTES
Subjective:      Chief Complaint: Follow-up      HPI:She is here for f/u DM type 2.  Her insurance has denied the Ozempic.  She has lost 30 pounds with the ozempic.  But she feels like its plateaued.  She has been doing some strength training.  She has occ abd wall or chest wall pain -- she is wondernig if it could be perimenopause.  Problem Noted   Type 2 Diabetes Mellitus 6/21/2024   Morbid Obesity 9/18/2023   Perimenopausal 9/18/2023   Well Adult Exam (Resolved) 6/21/2024   Muscular Abdominal Pain in Right Flank (Resolved) 8/20/2024   Muscle Strain of Chest Wall (Resolved) 8/20/2024        The patient's Health Maintenance was reviewed and the following appears to be due:   Health Maintenance Due   Topic Date Due    Hepatitis C Screening  Never done    Foot Exam  Never done    Eye Exam  Never done    HIV Screening  Never done    TETANUS VACCINE  Never done    Pneumococcal Vaccines (Age 50+) (1 of 2 - PCV) Never done    Colorectal Cancer Screening  11/26/2022    Shingles Vaccine (1 of 2) Never done    COVID-19 Vaccine (3 - 2024-25 season) 09/01/2024       Past Medical History:  Past Medical History:   Diagnosis Date    Muscle strain of chest wall 08/20/2024     Review of patient's allergies indicates:  No Known Allergies  Current Outpatient Medications on File Prior to Visit   Medication Sig Dispense Refill    progesterone (PROMETRIUM) 200 MG capsule Take 200 mg by mouth every evening.      semaglutide (OZEMPIC) 0.25 mg or 0.5 mg (2 mg/3 mL) pen injector Inject 0.5 mg into the skin every 7 days. 3 mL 6    testosterone (ANDROGEL) 1 % (50 mg/5 gram) GlPk Apply topically every evening.      [DISCONTINUED] LIDOcaine (LIDODERM) 5 % Place 1 patch onto the skin once daily. Remove & Discard patch within 12 hours or as directed by MD 15 patch 0     No current facility-administered medications on file prior to visit.       Review of Systems    Objective:   /72 (BP Location: Right arm, Patient Position: Sitting)    "Pulse 78   Resp 18   Ht 5' 5" (1.651 m)   Wt 115.2 kg (254 lb)   SpO2 98%   BMI 42.27 kg/m²     Physical Exam  Vitals reviewed.   Constitutional:       General: She is not in acute distress.     Appearance: Normal appearance. She is not ill-appearing or diaphoretic.   HENT:      Head: Normocephalic and atraumatic.   Neck:      Vascular: No carotid bruit.   Cardiovascular:      Rate and Rhythm: Normal rate and regular rhythm.      Pulses: Normal pulses.      Heart sounds: Normal heart sounds.   Pulmonary:      Effort: Pulmonary effort is normal.      Breath sounds: Normal breath sounds.   Musculoskeletal:      Right lower leg: No edema.      Left lower leg: No edema.   Skin:     General: Skin is warm and dry.   Neurological:      General: No focal deficit present.      Mental Status: She is alert and oriented to person, place, and time.   Psychiatric:         Mood and Affect: Mood normal.         Behavior: Behavior normal.         Thought Content: Thought content normal.         Judgment: Judgment normal.       Assessment and Plan:     Morbid obesity  She's lost 30 pounds with the Ozempic.    Type 2 diabetes mellitus  Insurance doesn't want to cover it.  Will try metformin to see if she can tolerate that.  We also discussed her paying for the Zepbound out of pocket or seeing if her insurance would cover Victoza.      Follow up in about 4 months (around 4/26/2025).    Medications Ordered This Encounter   Medications    metFORMIN (GLUCOPHAGE) 500 MG tablet     Sig: Take 1 tablet (500 mg total) by mouth 2 (two) times daily with meals.     Dispense:  60 tablet     Refill:  6     [unfilled]  Orders Placed This Encounter   Procedures    Hemoglobin A1C     Standing Status:   Future     Standing Expiration Date:   6/26/2026       "

## 2024-12-26 NOTE — ASSESSMENT & PLAN NOTE
Insurance doesn't want to cover it.  Will try metformin to see if she can tolerate that.  We also discussed her paying for the Zepbound out of pocket or seeing if her insurance would cover Victoza.

## 2025-01-28 ENCOUNTER — PATIENT MESSAGE (OUTPATIENT)
Dept: INTERNAL MEDICINE | Facility: CLINIC | Age: 50
End: 2025-01-28
Payer: COMMERCIAL

## 2025-01-31 RX ORDER — SEMAGLUTIDE 0.68 MG/ML
0.5 INJECTION, SOLUTION SUBCUTANEOUS
Qty: 3 ML | Refills: 4 | Status: SHIPPED | OUTPATIENT
Start: 2025-01-31

## 2025-04-04 ENCOUNTER — PATIENT OUTREACH (OUTPATIENT)
Facility: CLINIC | Age: 50
End: 2025-04-04
Payer: COMMERCIAL

## 2025-04-04 LAB
LEFT EYE DM RETINOPATHY: NEGATIVE
RIGHT EYE DM RETINOPATHY: NEGATIVE

## 2025-04-04 NOTE — PROGRESS NOTES
Health Maintenance Topic(s) Outreach Outcomes & Actions Taken:    Eye Exam - Outreach Outcomes & Actions Taken  : Diabetic Eye External Records Uploaded, Care Team & History Updated if Applicable     Additional Notes:  DM Eye Exam 4/4/25

## 2025-04-07 ENCOUNTER — PATIENT MESSAGE (OUTPATIENT)
Dept: INTERNAL MEDICINE | Facility: CLINIC | Age: 50
End: 2025-04-07
Payer: COMMERCIAL

## 2025-04-07 DIAGNOSIS — E11.9 TYPE 2 DIABETES MELLITUS WITHOUT COMPLICATION, WITHOUT LONG-TERM CURRENT USE OF INSULIN: Primary | ICD-10-CM

## 2025-04-08 RX ORDER — SEMAGLUTIDE 1.34 MG/ML
1 INJECTION, SOLUTION SUBCUTANEOUS
Qty: 3 ML | Refills: 11 | Status: SHIPPED | OUTPATIENT
Start: 2025-04-08 | End: 2026-04-08

## 2025-04-22 ENCOUNTER — TELEPHONE (OUTPATIENT)
Dept: INTERNAL MEDICINE | Facility: CLINIC | Age: 50
End: 2025-04-22
Payer: COMMERCIAL

## 2025-04-22 NOTE — TELEPHONE ENCOUNTER
----- Message from Nurse Mendoza sent at 4/22/2025  1:24 PM CDT -----  Regarding: BHARATHI Burrell 4/29/25 @1:20p  Are there any outstanding tasks in the chart? Patient will need nonfasting labsIs there any documentation of tasks? noPlease tell patient to bring living will, power of , or advance directive document to visit if they have it.

## 2025-04-28 ENCOUNTER — TELEPHONE (OUTPATIENT)
Dept: INTERNAL MEDICINE | Facility: CLINIC | Age: 50
End: 2025-04-28
Payer: COMMERCIAL

## 2025-04-28 NOTE — TELEPHONE ENCOUNTER
Patient notified that appt will need to be rescheduled. I rescheduled her with Mattie for next Tuesday and she will go do her labs next Monday.

## 2025-04-28 NOTE — TELEPHONE ENCOUNTER
Debora Egan Staff  Caller: Unspecified (Today,  9:38 AM)  .Who Called: Brandee Walker    Patient is returning phone call    Who Left Message for Patient:PT  Does the patient know what this is regarding?:Medical advice--pt stated she has covid and wants to know what doct wants her to do with results--pls call pt back to follow up--also has appt tomorrow      Preferred Method of Contact: Phone Call  Patient's Preferred Phone Number on File: [phone]  Best Call Back Number, if different:  Additional Information: Medical advice--pt stated she has covid and wants to know what doct wants her to do with results--pls call pt back to follow up--also has appt tomorrow

## 2025-04-28 NOTE — TELEPHONE ENCOUNTER
Patient stated she returned from a group trip, received word that one of the group tested positive for COVID.  Patient took an at home test this morning and it was positive.  Patient c/o sinus congestion, post nasal drip, cough and lack of taste.  No fever, no other symptoms.  Patient stated she is taking tylenol.    Patient stated she has an appt at lab this afternoon for blood draw for tomorrow's appt with Dr. Burrell.  Patient asking if she should reschedule.      Please advise.

## 2025-05-05 ENCOUNTER — LAB VISIT (OUTPATIENT)
Dept: LAB | Facility: HOSPITAL | Age: 50
End: 2025-05-05
Attending: INTERNAL MEDICINE
Payer: COMMERCIAL

## 2025-05-05 DIAGNOSIS — E11.9 TYPE 2 DIABETES MELLITUS WITHOUT COMPLICATION, WITHOUT LONG-TERM CURRENT USE OF INSULIN: ICD-10-CM

## 2025-05-05 LAB
EST. AVERAGE GLUCOSE BLD GHB EST-MCNC: 102.5 MG/DL
HBA1C MFR BLD: 5.2 %

## 2025-05-05 PROCEDURE — 36415 COLL VENOUS BLD VENIPUNCTURE: CPT

## 2025-05-05 PROCEDURE — 83036 HEMOGLOBIN GLYCOSYLATED A1C: CPT

## 2025-05-06 ENCOUNTER — OFFICE VISIT (OUTPATIENT)
Dept: INTERNAL MEDICINE | Facility: CLINIC | Age: 50
End: 2025-05-06
Payer: COMMERCIAL

## 2025-05-06 VITALS
HEIGHT: 65 IN | BODY MASS INDEX: 40.82 KG/M2 | WEIGHT: 245 LBS | SYSTOLIC BLOOD PRESSURE: 108 MMHG | HEART RATE: 82 BPM | OXYGEN SATURATION: 98 % | DIASTOLIC BLOOD PRESSURE: 78 MMHG

## 2025-05-06 DIAGNOSIS — E11.9 TYPE 2 DIABETES MELLITUS WITHOUT COMPLICATION, WITHOUT LONG-TERM CURRENT USE OF INSULIN: Primary | ICD-10-CM

## 2025-05-06 DIAGNOSIS — E66.01 MORBID OBESITY: ICD-10-CM

## 2025-05-06 DIAGNOSIS — S01.81XA LACERATION OF FOREHEAD, INITIAL ENCOUNTER: ICD-10-CM

## 2025-05-06 PROCEDURE — 3008F BODY MASS INDEX DOCD: CPT | Mod: CPTII,,, | Performed by: NURSE PRACTITIONER

## 2025-05-06 PROCEDURE — 1160F RVW MEDS BY RX/DR IN RCRD: CPT | Mod: CPTII,,, | Performed by: NURSE PRACTITIONER

## 2025-05-06 PROCEDURE — 99214 OFFICE O/P EST MOD 30 MIN: CPT | Mod: ,,, | Performed by: NURSE PRACTITIONER

## 2025-05-06 PROCEDURE — 3044F HG A1C LEVEL LT 7.0%: CPT | Mod: CPTII,,, | Performed by: NURSE PRACTITIONER

## 2025-05-06 PROCEDURE — 3078F DIAST BP <80 MM HG: CPT | Mod: CPTII,,, | Performed by: NURSE PRACTITIONER

## 2025-05-06 PROCEDURE — 3074F SYST BP LT 130 MM HG: CPT | Mod: CPTII,,, | Performed by: NURSE PRACTITIONER

## 2025-05-06 PROCEDURE — 2023F DILAT RTA XM W/O RTNOPTHY: CPT | Mod: CPTII,,, | Performed by: NURSE PRACTITIONER

## 2025-05-06 PROCEDURE — 1159F MED LIST DOCD IN RCRD: CPT | Mod: CPTII,,, | Performed by: NURSE PRACTITIONER

## 2025-05-06 NOTE — PROGRESS NOTES
"Subjective:      Patient ID: Brandee Walker is a 50 y.o. female.    Chief Complaint: Follow-up (4 month)      HPI: Patient here today for 4 month follow up.  Recent dx of COVID with rescheduled appt with Dr. Burrell for that reason. She is doing well. She had issues originally with insurance covering Ozempic. She is now on Ozempic 1mg weekly and doing well with productive weight loss and stable BS.  No polyuria, polydipsia, or polyphagia. Denies CP, palpitations, or SOB.    Recent laceration above R eyebrow due to  hitting her on head with TV remote approximately one week ago. No fever, chills, sweats, or d/c.    Review of patient's allergies indicates:  No Known Allergies    Review of Systems  Constitutional: No fatigue, No weight loss.  Respiratory: No shortness of breath, No exertional dyspnea.   Cardiovascular: No chest pain, No palpitations, Musculoskeletal: No joint pain, No muscle pain.  Integumentary: No rash, No ecchymosis. R eye brow laceration.    Objective:   Visit Vitals  /78 (BP Location: Right arm, Patient Position: Sitting)   Pulse 82   Ht 5' 5" (1.651 m)   Wt 111.1 kg (245 lb)   SpO2 98%   BMI 40.77 kg/m²     The patient's weight trend is below:   Wt Readings from Last 4 Encounters:   05/06/25 111.1 kg (245 lb)   12/26/24 115.2 kg (254 lb)   09/25/24 119.3 kg (263 lb)   08/20/24 120.2 kg (265 lb)        Physical Exam  Vitals and nursing note reviewed.   Constitutional:       General: She is not in acute distress.     Appearance: Normal appearance. She is normal weight. She is not ill-appearing, toxic-appearing or diaphoretic.   HENT:      Head: Normocephalic and atraumatic.   Cardiovascular:      Rate and Rhythm: Normal rate and regular rhythm.      Pulses:           Dorsalis pedis pulses are 2+ on the right side and 2+ on the left side.        Posterior tibial pulses are 2+ on the right side and 2+ on the left side.      Heart sounds: Normal heart sounds.   Pulmonary:      Effort: " Pulmonary effort is normal.      Breath sounds: Normal breath sounds.   Musculoskeletal:      Right foot: No deformity.      Left foot: No deformity.   Feet:      Right foot:      Protective Sensation: 5 sites tested.  5 sites sensed.      Skin integrity: Skin integrity normal. No ulcer, blister, skin breakdown, erythema, warmth, callus, dry skin or fissure.      Toenail Condition: Right toenails are normal.      Left foot:      Protective Sensation: 5 sites tested.  5 sites sensed.      Skin integrity: Skin integrity normal. No ulcer, blister, skin breakdown, erythema, warmth, callus, dry skin or fissure.      Toenail Condition: Left toenails are normal.   Skin:     General: Skin is warm and dry.   Neurological:      General: No focal deficit present.      Mental Status: She is alert and oriented to person, place, and time. Mental status is at baseline.   Psychiatric:         Mood and Affect: Mood normal.         Behavior: Behavior normal.         Thought Content: Thought content normal.         Judgment: Judgment normal.         Assessment/Plan:   1. Type 2 diabetes mellitus without complication, without long-term current use of insulin  Assessment & Plan:  DIABETES RECOMMENDATIONS:  diabetic diet discussed in detail, low cholesterol diet, weight control and daily exercise discussed with recommendation for 150 minutes per week, home glucose monitoring emphasized, all medications, side effects and compliance discussed carefully, low carbohydrate diet, and continue diet and exercise for management of diabetes    Diabetes Medications              semaglutide (OZEMPIC) 1 mg/dose (4 mg/3 mL) Inject 1 mg into the skin every 7 days.             Orders:  -     CBC Auto Differential; Future; Expected date: 09/06/2025  -     Comprehensive Metabolic Panel; Future; Expected date: 09/06/2025  -     Lipid Panel; Future; Expected date: 09/06/2025  -     Hemoglobin A1C; Future; Expected date: 09/06/2025    2. Morbid  obesity  Assessment & Plan:  HEALTH EDUCATION RECOMMENDATIONS:  weight control, diabetes, diet and cholesterol, exercise 150 minutes per week, stress reduction, and adequate sleep 6-8 hours per night       Orders:  -     CBC Auto Differential; Future; Expected date: 09/06/2025  -     Comprehensive Metabolic Panel; Future; Expected date: 09/06/2025  -     Lipid Panel; Future; Expected date: 09/06/2025  -     Hemoglobin A1C; Future; Expected date: 09/06/2025    3. Laceration of forehead, initial encounter  Assessment & Plan:  Keep clean and dry  Ok to leave open to air  Triple antibiotic cream/Mederma            Medication List with Changes/Refills   Current Medications    PROGESTERONE (PROMETRIUM) 200 MG CAPSULE    Take 200 mg by mouth every evening.    SEMAGLUTIDE (OZEMPIC) 1 MG/DOSE (4 MG/3 ML)    Inject 1 mg into the skin every 7 days.    TESTOSTERONE (ANDROGEL) 1 % (50 MG/5 GRAM) GLPK    Apply topically every evening.   Discontinued Medications    METFORMIN (GLUCOPHAGE) 500 MG TABLET    Take 1 tablet (500 mg total) by mouth 2 (two) times daily with meals.        Follow up in about 4 months (around 9/6/2025) for With Dr. Burrell.    Chemistry:  Lab Results   Component Value Date     08/10/2024    K 4.9 08/10/2024    BUN 14.8 08/10/2024    CREATININE 0.91 08/10/2024    EGFRNORACEVR >60 08/10/2024    CALCIUM 9.3 08/10/2024    ALKPHOS 86 08/10/2024    ALBUMIN 3.9 08/10/2024    AST 29 08/10/2024    ALT 32 08/10/2024        Lab Results   Component Value Date    HGBA1C 5.2 05/05/2025        Hematology:  Lab Results   Component Value Date    WBC 10.31 08/10/2024    HGB 14.5 08/10/2024    HCT 42.3 08/10/2024     08/10/2024       Lipid Panel:  Lab Results   Component Value Date    CHOL 145 06/18/2024    HDL 51 06/18/2024    LDL 67.00 06/18/2024    TRIG 133 06/18/2024    TOTALCHOLEST 3 06/18/2024        Urine:  Lab Results   Component Value Date    CREATRANDUR 135.7 (H) 09/23/2024        Future Appointments   Date  Time Provider Department [place]   9/10/2025  1:00 PM Rima Burrell MD [id number]

## 2025-05-06 NOTE — ASSESSMENT & PLAN NOTE
DIABETES RECOMMENDATIONS:  diabetic diet discussed in detail, low cholesterol diet, weight control and daily exercise discussed with recommendation for 150 minutes per week, home glucose monitoring emphasized, all medications, side effects and compliance discussed carefully, low carbohydrate diet, and continue diet and exercise for management of diabetes    Diabetes Medications              semaglutide (OZEMPIC) 1 mg/dose (4 mg/3 mL) Inject 1 mg into the skin every 7 days.

## 2025-05-06 NOTE — ASSESSMENT & PLAN NOTE
HEALTH EDUCATION RECOMMENDATIONS:  weight control, diabetes, diet and cholesterol, exercise 150 minutes per week, stress reduction, and adequate sleep 6-8 hours per night

## 2025-09-04 ENCOUNTER — OFFICE VISIT (OUTPATIENT)
Dept: INTERNAL MEDICINE | Facility: CLINIC | Age: 50
End: 2025-09-04
Payer: COMMERCIAL

## 2025-09-04 VITALS
HEART RATE: 63 BPM | OXYGEN SATURATION: 100 % | WEIGHT: 239 LBS | RESPIRATION RATE: 18 BRPM | BODY MASS INDEX: 39.82 KG/M2 | DIASTOLIC BLOOD PRESSURE: 86 MMHG | SYSTOLIC BLOOD PRESSURE: 136 MMHG | HEIGHT: 65 IN

## 2025-09-04 DIAGNOSIS — E11.9 TYPE 2 DIABETES MELLITUS WITHOUT COMPLICATION, WITHOUT LONG-TERM CURRENT USE OF INSULIN: Primary | ICD-10-CM

## 2025-09-04 PROCEDURE — 3079F DIAST BP 80-89 MM HG: CPT | Mod: CPTII,,, | Performed by: INTERNAL MEDICINE

## 2025-09-04 PROCEDURE — 99213 OFFICE O/P EST LOW 20 MIN: CPT | Mod: ,,, | Performed by: INTERNAL MEDICINE

## 2025-09-04 PROCEDURE — 3044F HG A1C LEVEL LT 7.0%: CPT | Mod: CPTII,,, | Performed by: INTERNAL MEDICINE

## 2025-09-04 PROCEDURE — 3008F BODY MASS INDEX DOCD: CPT | Mod: CPTII,,, | Performed by: INTERNAL MEDICINE

## 2025-09-04 PROCEDURE — 2023F DILAT RTA XM W/O RTNOPTHY: CPT | Mod: CPTII,,, | Performed by: INTERNAL MEDICINE

## 2025-09-04 PROCEDURE — 3075F SYST BP GE 130 - 139MM HG: CPT | Mod: CPTII,,, | Performed by: INTERNAL MEDICINE

## 2025-09-04 PROCEDURE — 1160F RVW MEDS BY RX/DR IN RCRD: CPT | Mod: CPTII,,, | Performed by: INTERNAL MEDICINE

## 2025-09-04 PROCEDURE — 1159F MED LIST DOCD IN RCRD: CPT | Mod: CPTII,,, | Performed by: INTERNAL MEDICINE

## 2025-09-04 RX ORDER — SEMAGLUTIDE 2.68 MG/ML
2 INJECTION, SOLUTION SUBCUTANEOUS
Qty: 3 ML | Refills: 11 | Status: SHIPPED | OUTPATIENT
Start: 2025-09-04 | End: 2026-09-04